# Patient Record
Sex: MALE | Race: WHITE | NOT HISPANIC OR LATINO | Employment: OTHER | URBAN - METROPOLITAN AREA
[De-identification: names, ages, dates, MRNs, and addresses within clinical notes are randomized per-mention and may not be internally consistent; named-entity substitution may affect disease eponyms.]

---

## 2023-09-18 ENCOUNTER — HOSPITAL ENCOUNTER (OUTPATIENT)
Dept: DATA CONVERSION | Facility: HOSPITAL | Age: 73
Discharge: HOME | End: 2023-09-18
Payer: COMMERCIAL

## 2023-09-18 DIAGNOSIS — R97.20 ELEVATED PROSTATE SPECIFIC ANTIGEN (PSA): ICD-10-CM

## 2023-09-18 DIAGNOSIS — E78.5 HYPERLIPIDEMIA, UNSPECIFIED: ICD-10-CM

## 2023-09-18 DIAGNOSIS — E03.9 HYPOTHYROIDISM, UNSPECIFIED: ICD-10-CM

## 2023-09-18 LAB
ALBUMIN SERPL-MCNC: 3.8 GM/DL (ref 3.5–5)
ALBUMIN/GLOB SERPL: 1.3 RATIO (ref 1.5–3)
ALP BLD-CCNC: 68 U/L (ref 35–125)
ALT SERPL-CCNC: 15 U/L (ref 5–40)
ANION GAP SERPL CALCULATED.3IONS-SCNC: 11 MMOL/L (ref 0–19)
APPEARANCE PLAS: CLEAR
AST SERPL-CCNC: 26 U/L (ref 5–40)
BILIRUB SERPL-MCNC: 0.7 MG/DL (ref 0.1–1.2)
BUN SERPL-MCNC: 14 MG/DL (ref 8–25)
BUN/CREAT SERPL: 12.7 RATIO (ref 8–21)
CALCIUM SERPL-MCNC: 9 MG/DL (ref 8.5–10.4)
CHLORIDE SERPL-SCNC: 104 MMOL/L (ref 97–107)
CHOLEST SERPL-MCNC: 172 MG/DL (ref 133–200)
CHOLEST/HDLC SERPL: 2.2 RATIO
CO2 SERPL-SCNC: 23 MMOL/L (ref 24–31)
COLOR SPUN FLD: YELLOW
CREAT SERPL-MCNC: 1.1 MG/DL (ref 0.4–1.6)
FASTING STATUS PATIENT QL REPORTED: NORMAL
GFR SERPL CREATININE-BSD FRML MDRD: 72 ML/MIN/1.73 M2
GLOBULIN SER-MCNC: 2.9 G/DL (ref 1.9–3.7)
GLUCOSE SERPL-MCNC: 89 MG/DL (ref 65–99)
HDLC SERPL-MCNC: 77 MG/DL
LDLC SERPL CALC-MCNC: 86 MG/DL (ref 65–130)
POTASSIUM SERPL-SCNC: 4 MMOL/L (ref 3.4–5.1)
PROT SERPL-MCNC: 6.7 G/DL (ref 5.9–7.9)
PSA SERPL-MCNC: 5.9 NG/ML (ref 0–4.1)
SODIUM SERPL-SCNC: 138 MMOL/L (ref 133–145)
TRIGL SERPL-MCNC: 45 MG/DL (ref 40–150)
TSH SERPL DL<=0.05 MIU/L-ACNC: 3.39 MIU/L (ref 0.27–4.2)

## 2023-09-26 ENCOUNTER — HOSPITAL ENCOUNTER (OUTPATIENT)
Dept: DATA CONVERSION | Facility: HOSPITAL | Age: 73
Discharge: HOME | End: 2023-09-26
Payer: COMMERCIAL

## 2023-09-26 DIAGNOSIS — F17.201 NICOTINE DEPENDENCE, UNSPECIFIED, IN REMISSION: ICD-10-CM

## 2023-10-05 ENCOUNTER — TELEPHONE (OUTPATIENT)
Dept: PRIMARY CARE | Facility: CLINIC | Age: 73
End: 2023-10-05
Payer: COMMERCIAL

## 2023-10-06 ENCOUNTER — TELEPHONE (OUTPATIENT)
Dept: PRIMARY CARE | Facility: CLINIC | Age: 73
End: 2023-10-06
Payer: COMMERCIAL

## 2023-10-06 DIAGNOSIS — E78.5 HYPERLIPIDEMIA, UNSPECIFIED HYPERLIPIDEMIA TYPE: Primary | ICD-10-CM

## 2023-10-11 ENCOUNTER — HOSPITAL ENCOUNTER (OUTPATIENT)
Dept: RADIOLOGY | Facility: HOSPITAL | Age: 73
Discharge: HOME | End: 2023-10-11
Payer: COMMERCIAL

## 2023-10-11 DIAGNOSIS — J44.9 CHRONIC OBSTRUCTIVE PULMONARY DISEASE, UNSPECIFIED (MULTI): ICD-10-CM

## 2023-10-11 PROCEDURE — 75571 CT HRT W/O DYE W/CA TEST: CPT

## 2023-10-13 PROBLEM — E78.5 HYPERLIPIDEMIA: Status: ACTIVE | Noted: 2023-10-13

## 2023-10-13 RX ORDER — ATORVASTATIN CALCIUM 10 MG/1
10 TABLET, FILM COATED ORAL DAILY
Qty: 90 TABLET | Refills: 1 | Status: SHIPPED | OUTPATIENT
Start: 2023-10-13 | End: 2024-01-18

## 2023-10-13 NOTE — TELEPHONE ENCOUNTER
Pt called and given result , states that  he agrees  to the cholesterol lowering medicatiion, so it can be sent to pharmacy.

## 2023-10-13 NOTE — TELEPHONE ENCOUNTER
Atorvastatin 10 mg daily sent to pharmacy with plans to monitor blood work for tolerance/effectiveness

## 2023-10-17 ENCOUNTER — TELEPHONE (OUTPATIENT)
Dept: PRIMARY CARE | Facility: CLINIC | Age: 73
End: 2023-10-17
Payer: COMMERCIAL

## 2023-10-17 DIAGNOSIS — I25.10 CORONARY ATHEROSCLEROSIS DUE TO CALCIFIED CORONARY LESION: ICD-10-CM

## 2023-10-17 DIAGNOSIS — I25.84 CORONARY ATHEROSCLEROSIS DUE TO CALCIFIED CORONARY LESION: ICD-10-CM

## 2023-10-17 DIAGNOSIS — E78.5 HYPERLIPIDEMIA, UNSPECIFIED HYPERLIPIDEMIA TYPE: Primary | ICD-10-CM

## 2023-10-17 NOTE — TELEPHONE ENCOUNTER
Per result documentation, patient had notable calcifications on CT coronary calcium scoring.  Patient requested cardiology consultation.  Referral placed.  Patient would like referral information mailed to him.  Does not want a call back regarding information

## 2023-10-23 ENCOUNTER — TELEPHONE (OUTPATIENT)
Dept: PRIMARY CARE | Facility: CLINIC | Age: 73
End: 2023-10-23
Payer: COMMERCIAL

## 2023-10-23 DIAGNOSIS — I25.10 ATHEROSCLEROSIS OF NATIVE CORONARY ARTERY OF NATIVE HEART WITHOUT ANGINA PECTORIS: Primary | ICD-10-CM

## 2023-10-23 DIAGNOSIS — E78.5 HYPERLIPIDEMIA, UNSPECIFIED HYPERLIPIDEMIA TYPE: ICD-10-CM

## 2023-10-23 NOTE — TELEPHONE ENCOUNTER
Pt states the he set up a appointment 12/04/2023 at 1:30pm with DR. Pio Nino, states that he needs a referral sent over. Please advise

## 2023-10-24 DIAGNOSIS — E03.8 SUBCLINICAL HYPOTHYROIDISM: ICD-10-CM

## 2023-10-27 PROBLEM — E75.6 LIPIDOSIS: Status: ACTIVE | Noted: 2023-10-27

## 2023-10-27 PROBLEM — E78.5 COMPLEX DYSLIPIDEMIA: Status: ACTIVE | Noted: 2023-10-27

## 2023-10-27 PROBLEM — E03.8 SUBCLINICAL HYPOTHYROIDISM: Status: ACTIVE | Noted: 2023-10-27

## 2023-10-27 PROBLEM — K86.89 SECONDARY PANCREATIC INSUFFICIENCY (HHS-HCC): Status: ACTIVE | Noted: 2023-10-27

## 2023-10-27 PROBLEM — E05.90 SUBCLINICAL HYPERTHYROIDISM: Status: ACTIVE | Noted: 2023-10-27

## 2023-10-27 PROBLEM — I38 VALVULAR HEART DISEASE: Status: ACTIVE | Noted: 2023-10-27

## 2023-10-27 PROBLEM — G47.19 EXCESSIVE DAYTIME SLEEPINESS: Status: ACTIVE | Noted: 2023-10-27

## 2023-10-27 PROBLEM — E05.80 IATROGENIC HYPERTHYROIDISM: Status: ACTIVE | Noted: 2023-10-27

## 2023-10-27 PROBLEM — M19.90 ARTHRITIS: Status: ACTIVE | Noted: 2023-10-27

## 2023-10-27 PROBLEM — G43.909 MIGRAINE: Status: ACTIVE | Noted: 2023-10-27

## 2023-10-27 PROBLEM — G47.33 OBSTRUCTIVE SLEEP APNEA SYNDROME: Status: ACTIVE | Noted: 2023-10-27

## 2023-10-27 PROBLEM — R63.6 MILDLY UNDERWEIGHT ADULT: Status: ACTIVE | Noted: 2023-10-27

## 2023-10-27 PROBLEM — G47.30 SLEEP-DISORDERED BREATHING: Status: ACTIVE | Noted: 2023-10-27

## 2023-10-27 PROBLEM — K21.9 GASTROESOPHAGEAL REFLUX DISEASE: Status: ACTIVE | Noted: 2023-10-27

## 2023-10-27 PROBLEM — C15.9 ESOPHAGEAL CANCER (MULTI): Status: ACTIVE | Noted: 2023-10-27

## 2023-10-27 PROBLEM — J44.9 CHRONIC OBSTRUCTIVE PULMONARY DISEASE (MULTI): Status: ACTIVE | Noted: 2023-10-27

## 2023-10-27 PROBLEM — E06.3 AUTOIMMUNE THYROIDITIS: Status: ACTIVE | Noted: 2023-10-27

## 2023-10-27 PROBLEM — G43.009 MIGRAINE WITHOUT AURA AND WITHOUT STATUS MIGRAINOSUS, NOT INTRACTABLE: Status: ACTIVE | Noted: 2023-10-27

## 2023-10-27 PROBLEM — R53.83 FATIGUE: Status: ACTIVE | Noted: 2023-10-27

## 2023-10-27 PROBLEM — G44.009 CLUSTER HEADACHE SYNDROME: Status: ACTIVE | Noted: 2023-10-27

## 2023-10-27 PROBLEM — M54.12 CERVICAL RADICULOPATHY: Status: ACTIVE | Noted: 2023-10-27

## 2023-10-27 RX ORDER — RIZATRIPTAN BENZOATE 5 MG/1
1 TABLET ORAL DAILY PRN
COMMUNITY
Start: 2021-10-21 | End: 2024-01-18

## 2023-10-27 RX ORDER — MOMETASONE FUROATE AND FORMOTEROL FUMARATE DIHYDRATE 200; 5 UG/1; UG/1
AEROSOL RESPIRATORY (INHALATION) EVERY 12 HOURS
COMMUNITY
Start: 2021-12-08

## 2023-10-27 RX ORDER — PANTOPRAZOLE SODIUM 40 MG/1
TABLET, DELAYED RELEASE ORAL
COMMUNITY
Start: 2014-10-26 | End: 2024-01-22 | Stop reason: SDUPTHER

## 2023-10-27 RX ORDER — LEVOTHYROXINE SODIUM 50 UG/1
TABLET ORAL
Qty: 102 TABLET | Refills: 1 | Status: SHIPPED | OUTPATIENT
Start: 2023-10-27 | End: 2024-04-17

## 2023-10-27 RX ORDER — ALBUTEROL SULFATE 90 UG/1
AEROSOL, METERED RESPIRATORY (INHALATION) EVERY 4 HOURS
COMMUNITY

## 2023-10-27 RX ORDER — LEVOTHYROXINE SODIUM 50 UG/1
50 TABLET ORAL
COMMUNITY
End: 2024-01-18 | Stop reason: SDUPTHER

## 2023-11-27 ENCOUNTER — APPOINTMENT (OUTPATIENT)
Dept: UROLOGY | Facility: CLINIC | Age: 73
End: 2023-11-27
Payer: COMMERCIAL

## 2023-11-30 ENCOUNTER — OFFICE VISIT (OUTPATIENT)
Dept: UROLOGY | Facility: CLINIC | Age: 73
End: 2023-11-30
Payer: COMMERCIAL

## 2023-11-30 ENCOUNTER — LAB (OUTPATIENT)
Dept: LAB | Facility: LAB | Age: 73
End: 2023-11-30
Payer: COMMERCIAL

## 2023-11-30 DIAGNOSIS — R97.20 ELEVATED PSA: ICD-10-CM

## 2023-11-30 DIAGNOSIS — N40.0 PROSTATE ENLARGEMENT: Primary | ICD-10-CM

## 2023-11-30 LAB
APPEARANCE UR: CLEAR
BILIRUB UR STRIP.AUTO-MCNC: NEGATIVE MG/DL
COLOR UR: NORMAL
CREAT SERPL-MCNC: 1.11 MG/DL (ref 0.5–1.3)
GFR SERPL CREATININE-BSD FRML MDRD: 71 ML/MIN/1.73M*2
GLUCOSE UR STRIP.AUTO-MCNC: NEGATIVE MG/DL
KETONES UR STRIP.AUTO-MCNC: NEGATIVE MG/DL
LEUKOCYTE ESTERASE UR QL STRIP.AUTO: NEGATIVE
NITRITE UR QL STRIP.AUTO: NEGATIVE
PH UR STRIP.AUTO: 5 [PH]
PROT UR STRIP.AUTO-MCNC: NEGATIVE MG/DL
RBC # UR STRIP.AUTO: NEGATIVE /UL
SP GR UR STRIP.AUTO: 1.01
UROBILINOGEN UR STRIP.AUTO-MCNC: <2 MG/DL

## 2023-11-30 PROCEDURE — 36415 COLL VENOUS BLD VENIPUNCTURE: CPT

## 2023-11-30 PROCEDURE — 81003 URINALYSIS AUTO W/O SCOPE: CPT

## 2023-11-30 PROCEDURE — 99204 OFFICE O/P NEW MOD 45 MIN: CPT | Performed by: STUDENT IN AN ORGANIZED HEALTH CARE EDUCATION/TRAINING PROGRAM

## 2023-11-30 PROCEDURE — 82565 ASSAY OF CREATININE: CPT

## 2023-11-30 PROCEDURE — 87086 URINE CULTURE/COLONY COUNT: CPT

## 2023-11-30 NOTE — PROGRESS NOTES
Subjective   Patient ID:   HPI    Irving Sandoval is a 73 y.o. male who presents for elevated psa referred by Dr Todd.    No LUTS.    No prior investigation of elevated PSA as per the patient    Cancer esophageal in 2015  No issue speeing  Fam hx father pelvic CA, Mother neck CA  Brother passed away from CA, unsure what type  Not sexually active.        Last PSA 09/18/2023:  Lab Results   Component Value Date    PSA 5.9 (H) 09/18/2023    PSA 5.2 (H) 03/02/2023    PSA 4.7 (H) 10/14/2022         Review of Systems    A complete review of systems was performed. All systems are noted to be negative unless indicated in the history of present illness, impression, active problem list, or past histories.      Objective   Physical Exam    General: Well developed, well nourished, alert and cooperative, appears in no acute distress     Eyes: Non-injected conjunctiva, sclera clear, no proptosis     Cardiac: Extremities are warm and well perfused. No edema, cyanosis or pallor.     Lungs: Breathing is easy, non-labored. Speaking in clear and complete sentences. Normal diaphragmatic movement.     Abdomen: soft, non-tender     MSK: Ambulatory with steady gait, unassisted     Neuro: alert and oriented to person, place and time     Psych: Demonstrates good judgement and reason, without hallucinations, abnormal affect or abnormal behaviors.     Skin: no obvious lesions, no rashes.     : phallus circumcised, normal in size, no plaques or lesions. Testicles normal in size and texture, no masses     JAIME: prostate enlarged, smooth surface, no nodules       Assessment/Plan   Diagnoses and all orders for this visit:  Elevated PSA  -     Urine culture; Future  -     Urinalysis with Reflex Microscopic; Future  -     MR prostate geraldine boundaries; Future  -     Creatinine, Serum; Future       Elevated PSA:    I reviewed with the patient the value and significance of a rising PSA, and the role in screening and early detection of prostate  cancer. I explained that PSA is prostate specific, yet not prostate cancer specific, and typical etiology for the rise of PSA include UTI, prostate enlargement, prostatic inflammation such as a bacterial prostatitis, urinary retention, and prostate cancer. To narrow our differential diagnosis, we rule out urinary infection and retention, and perform a prostate MRI which will give us more information about the size of the prostate, possible inflammation, and suspicious lesions which will serve as targets for MRI/Ultrasound guided fusion targeted biopsy of the prostate.  Positive MRI will necessitate a biopsy of the indicated lesions, whereas a negative MRI will be interpreted in the context of the clinical suspicion, which includes the PSA velocity (speed of rise of PSA), PSA density, age of the patient, and positive family history for prostate cancer or even breast cancer.  I explained to him that prostate cancer is the most common malignancy in men, however it is not the most common cancer killer for several reasons. These are related to the fact that prostate cancer is mostly not aggressive, slow in it growth, progression, and recurrence. In a lot of men, prostate cancer is not diagnosed and still does not result in cancer-related death, and as such active surveillance has become an accepted management option in low-grade low-stage low-volume prostate cancer. A proportion of men with prostate cancer still require treatment in order to prevent progression and metastasis, and some men might require a multidisciplinary management including different combinations of surgery, radiation, and systemic therapy. In order to better diagnose and decide on treatment options in prostate cancer, proper diagnosis should be performed and shared decision making between the physician and the patient is key at that point.    The patient understands the overall situation, and is willing to proceed with the plan starting with urine  testing followed by MRI of the prostate.    Plan:  Urinalysis and urine culture  MRI of the prostate  Fu in 6 weeks        Scribe Attestation  By signing my name below, I, Bea Marinelli   attest that this documentation has been prepared under the direction and in the presence of Tj Coley MD.

## 2023-12-01 LAB — BACTERIA UR CULT: NO GROWTH

## 2023-12-04 ENCOUNTER — OFFICE VISIT (OUTPATIENT)
Dept: CARDIOLOGY | Facility: CLINIC | Age: 73
End: 2023-12-04
Payer: COMMERCIAL

## 2023-12-04 VITALS
DIASTOLIC BLOOD PRESSURE: 82 MMHG | OXYGEN SATURATION: 96 % | WEIGHT: 96 LBS | BODY MASS INDEX: 15.49 KG/M2 | SYSTOLIC BLOOD PRESSURE: 139 MMHG | HEART RATE: 72 BPM

## 2023-12-04 DIAGNOSIS — E78.5 HYPERLIPIDEMIA, UNSPECIFIED HYPERLIPIDEMIA TYPE: ICD-10-CM

## 2023-12-04 DIAGNOSIS — I25.84 CORONARY ATHEROSCLEROSIS DUE TO CALCIFIED CORONARY LESION: ICD-10-CM

## 2023-12-04 DIAGNOSIS — I25.10 ATHEROSCLEROSIS OF NATIVE CORONARY ARTERY OF NATIVE HEART WITHOUT ANGINA PECTORIS: ICD-10-CM

## 2023-12-04 DIAGNOSIS — I25.10 CORONARY ATHEROSCLEROSIS DUE TO CALCIFIED CORONARY LESION: ICD-10-CM

## 2023-12-04 PROCEDURE — 99214 OFFICE O/P EST MOD 30 MIN: CPT | Performed by: INTERNAL MEDICINE

## 2023-12-04 PROCEDURE — 93005 ELECTROCARDIOGRAM TRACING: CPT | Performed by: INTERNAL MEDICINE

## 2023-12-04 PROCEDURE — 1126F AMNT PAIN NOTED NONE PRSNT: CPT | Performed by: INTERNAL MEDICINE

## 2023-12-04 PROCEDURE — 1160F RVW MEDS BY RX/DR IN RCRD: CPT | Performed by: INTERNAL MEDICINE

## 2023-12-04 PROCEDURE — 1036F TOBACCO NON-USER: CPT | Performed by: INTERNAL MEDICINE

## 2023-12-04 PROCEDURE — 1159F MED LIST DOCD IN RCRD: CPT | Performed by: INTERNAL MEDICINE

## 2023-12-04 PROCEDURE — 99204 OFFICE O/P NEW MOD 45 MIN: CPT | Performed by: INTERNAL MEDICINE

## 2023-12-04 PROCEDURE — 93010 ELECTROCARDIOGRAM REPORT: CPT | Performed by: INTERNAL MEDICINE

## 2023-12-04 RX ORDER — TIOTROPIUM BROMIDE 18 UG/1
1 CAPSULE ORAL; RESPIRATORY (INHALATION)
COMMUNITY
End: 2024-01-18

## 2023-12-04 ASSESSMENT — ENCOUNTER SYMPTOMS
COUGH: 1
EYES NEGATIVE: 1
DEPRESSION: 0
CONSTITUTIONAL NEGATIVE: 1
LOSS OF SENSATION IN FEET: 0
SHORTNESS OF BREATH: 1
OCCASIONAL FEELINGS OF UNSTEADINESS: 0
VOMITING: 0
NEUROLOGICAL NEGATIVE: 1
NAUSEA: 0

## 2023-12-04 ASSESSMENT — PATIENT HEALTH QUESTIONNAIRE - PHQ9
SUM OF ALL RESPONSES TO PHQ9 QUESTIONS 1 AND 2: 0
1. LITTLE INTEREST OR PLEASURE IN DOING THINGS: NOT AT ALL
2. FEELING DOWN, DEPRESSED OR HOPELESS: NOT AT ALL

## 2023-12-04 ASSESSMENT — PAIN SCALES - GENERAL: PAINLEVEL: 0-NO PAIN

## 2023-12-04 NOTE — PROGRESS NOTES
Subjective      Chief Complaint   Patient presents with    Dr. Todd ref pt for recent ct calcium score          This is a 73-year-old white male we are asked to see for cardiac evaluation.  He does have a history of smoking which he quit in 2014 he also has a history of hypercholesterolemia.  There is no history of hypertension diabetes mellitus or family history coronary artery disease.  He had a cardiac calcium score it was 424.  He does have sleep apnea and does get short of breath with minimal exertion.  He says he can walk maybe 50 feet before he gets short of breath.  Approximately 2 nights ago he complained of some chest discomfort located midsternally he cannot say for sharp he was short of breath with it.  It lasted approximately 2 hours.  He has not had before nor since.  He does not complain symptoms of PND or orthopnea.  He was told a child he does have a heart murmur but that is gone.  He is never had rheumatic fever.  He does have a history of esophageal cancer in 2015 and had surgery for this with part of his stomach being moved up into the chest.  His EKG done today shows a normal sinus rhythm. He has cholesterol drawn with a total of 872 HDL 77 and the LDL was 86.  He has been started on statin has not started taking it as of yet.           Review of Systems   Constitutional: Negative.   HENT: Negative.     Eyes: Negative.    Respiratory:  Positive for cough and shortness of breath.    Skin: Negative.    Musculoskeletal:  Positive for arthritis.   Gastrointestinal:  Negative for nausea and vomiting.   Genitourinary: Negative.    Neurological: Negative.         History reviewed. No pertinent surgical history.     Active Ambulatory Problems     Diagnosis Date Noted    Hyperlipidemia 10/13/2023    Atherosclerosis of native coronary artery of native heart without angina pectoris 10/23/2023    Arthritis 10/27/2023    Cervical radiculopathy 10/27/2023    Chronic obstructive pulmonary disease (CMS/[facility])  10/27/2023    Cluster headache syndrome 10/27/2023    Complex dyslipidemia 10/27/2023    Esophageal cancer (CMS/HCC) 10/27/2023    Excessive daytime sleepiness 10/27/2023    Fatigue 10/27/2023    Gastroesophageal reflux disease 10/27/2023    Autoimmune thyroiditis 10/27/2023    Iatrogenic hyperthyroidism 10/27/2023    Lipidosis 10/27/2023    Migraine 10/27/2023    Migraine without aura and without status migrainosus, not intractable 10/27/2023    Mildly underweight adult 10/27/2023    Obstructive sleep apnea syndrome 10/27/2023    Sleep-disordered breathing 10/27/2023    Secondary pancreatic insufficiency 10/27/2023    Subclinical hyperthyroidism 10/27/2023    Subclinical hypothyroidism 10/27/2023    Valvular heart disease 10/27/2023     Resolved Ambulatory Problems     Diagnosis Date Noted    No Resolved Ambulatory Problems     Past Medical History:   Diagnosis Date    COPD (chronic obstructive pulmonary disease) (CMS/HCC)     Hyperlipidemia, unspecified     Hypothyroidism, unspecified     Lipid storage disorder, unspecified     Migraine, unspecified, not intractable, without status migrainosus     Obstructive sleep apnea (adult) (pediatric)     Other hypersomnia     Personal history of malignant neoplasm of esophagus     Personal history of other diseases of the circulatory system     Personal history of other diseases of the musculoskeletal system and connective tissue     Personal history of other endocrine, nutritional and metabolic disease     Personal history of other endocrine, nutritional and metabolic disease     Personal history of other specified conditions     Radiculopathy, cervical region     Sleep apnea, unspecified         Visit Vitals  /82   Pulse 72   Wt (!) 43.5 kg (96 lb)   SpO2 96%   BMI 15.49 kg/m²   Smoking Status Former   BSA 1.42 m²        Objective     Constitutional:       Appearance: Healthy appearance.   Eyes:      Pupils: Pupils are equal, round, and reactive to light.   Neck:     "  Vascular: JVD normal.   Pulmonary:      Breath sounds: Normal breath sounds.   Cardiovascular:      PMI at left midclavicular line. Normal rate. Regular rhythm. Normal S1. Normal S2.       Murmurs: There is no murmur.      No gallop.  No click. No rub.   Pulses:     Intact distal pulses.   Edema:     Peripheral edema absent.   Abdominal:      Palpations: Abdomen is soft.      Tenderness: There is no abdominal tenderness.   Musculoskeletal:      Extremities: No clubbing present.Skin:     General: Skin is warm and dry.   Neurological:      General: No focal deficit present.            Lab Review:         Lab Results   Component Value Date    CHOL 172 09/18/2023    CHOL 159 10/14/2022    CHOL 191 09/28/2021     Lab Results   Component Value Date    HDL 77 09/18/2023    HDL 57 10/14/2022    HDL 75 09/28/2021     Lab Results   Component Value Date    LDLCALC 86 09/18/2023    LDLCALC 85 10/14/2022    LDLCALC 102 09/28/2021     Lab Results   Component Value Date    TRIG 45 09/18/2023    TRIG 87 10/14/2022    TRIG 70 09/28/2021     No components found for: \"CHOLHDL\"     Assessment/Plan     Atherosclerosis of native coronary artery of native heart without angina pectoris  This is a 73-year-old white male who does have relatively high cardiac calcium score at 424.  He was a smoker does have COPD as well as sleep apnea.  He does have problems with walking with shortness of breath and fatigue.  He did have some episode approximately 2 or 3 nights ago with prolonged chest discomforts.  Cannot say if this was an anginal episode or neuromuscular.  Would like to do a stress test on him.  He is unable to walk because of his COPD and we will do a Lexiscan stress test.  Would like to see him back for results.    Hyperlipidemia  He does have a elevated cardiac calcium score he was to be started on atorvastatin which she has not taken as of yet.  I encouraged him to start this.  Like to see him back after his stress test.     "

## 2023-12-04 NOTE — ASSESSMENT & PLAN NOTE
He does have a elevated cardiac calcium score he was to be started on atorvastatin which she has not taken as of yet.  I encouraged him to start this.  Like to see him back after his stress test.

## 2023-12-04 NOTE — ASSESSMENT & PLAN NOTE
This is a 73-year-old white male who does have relatively high cardiac calcium score at 424.  He was a smoker does have COPD as well as sleep apnea.  He does have problems with walking with shortness of breath and fatigue.  He did have some episode approximately 2 or 3 nights ago with prolonged chest discomforts.  Cannot say if this was an anginal episode or neuromuscular.  Would like to do a stress test on him.  He is unable to walk because of his COPD and we will do a Lexiscan stress test.  Would like to see him back for results.

## 2023-12-07 DIAGNOSIS — R97.20 ELEVATED PSA: ICD-10-CM

## 2023-12-19 ENCOUNTER — HOSPITAL ENCOUNTER (OUTPATIENT)
Dept: RADIOLOGY | Facility: HOSPITAL | Age: 73
End: 2023-12-19
Payer: COMMERCIAL

## 2023-12-19 ENCOUNTER — APPOINTMENT (OUTPATIENT)
Dept: CARDIOLOGY | Facility: HOSPITAL | Age: 73
End: 2023-12-19
Payer: COMMERCIAL

## 2023-12-19 ENCOUNTER — HOSPITAL ENCOUNTER (OUTPATIENT)
Dept: RADIOLOGY | Facility: HOSPITAL | Age: 73
Discharge: HOME | End: 2023-12-19
Payer: COMMERCIAL

## 2023-12-19 DIAGNOSIS — I25.84 CORONARY ATHEROSCLEROSIS DUE TO CALCIFIED CORONARY LESION: ICD-10-CM

## 2023-12-19 DIAGNOSIS — I25.10 CORONARY ATHEROSCLEROSIS DUE TO CALCIFIED CORONARY LESION: ICD-10-CM

## 2023-12-20 ENCOUNTER — TELEPHONE (OUTPATIENT)
Dept: PRIMARY CARE | Facility: CLINIC | Age: 73
End: 2023-12-20

## 2023-12-20 NOTE — TELEPHONE ENCOUNTER
Pt called our office stating that he has a MRI scheduled for tomorrow, states that his insurance denied it due to not having enough information regarding the reason behind the order,states that they need more information to be sent over in order for them to approve or pt may need to cancel his MRI. Please advise

## 2023-12-21 ENCOUNTER — APPOINTMENT (OUTPATIENT)
Dept: RADIOLOGY | Facility: HOSPITAL | Age: 73
End: 2023-12-21
Payer: COMMERCIAL

## 2023-12-22 ENCOUNTER — HOSPITAL ENCOUNTER (OUTPATIENT)
Dept: CARDIOLOGY | Facility: HOSPITAL | Age: 73
Discharge: HOME | End: 2023-12-22
Payer: COMMERCIAL

## 2023-12-22 ENCOUNTER — HOSPITAL ENCOUNTER (OUTPATIENT)
Dept: RADIOLOGY | Facility: HOSPITAL | Age: 73
Discharge: HOME | End: 2023-12-22
Payer: COMMERCIAL

## 2023-12-22 PROCEDURE — 3430000001 HC RX 343 DIAGNOSTIC RADIOPHARMACEUTICALS: Performed by: INTERNAL MEDICINE

## 2023-12-22 PROCEDURE — 2500000004 HC RX 250 GENERAL PHARMACY W/ HCPCS (ALT 636 FOR OP/ED): Performed by: INTERNAL MEDICINE

## 2023-12-22 PROCEDURE — A9502 TC99M TETROFOSMIN: HCPCS | Performed by: INTERNAL MEDICINE

## 2023-12-22 PROCEDURE — 93017 CV STRESS TEST TRACING ONLY: CPT

## 2023-12-22 PROCEDURE — 78452 HT MUSCLE IMAGE SPECT MULT: CPT

## 2023-12-22 PROCEDURE — 93017 CV STRESS TEST TRACING ONLY: CPT | Mod: MUE

## 2023-12-22 RX ORDER — REGADENOSON 0.08 MG/ML
0.4 INJECTION, SOLUTION INTRAVENOUS ONCE
Status: COMPLETED | OUTPATIENT
Start: 2023-12-22 | End: 2023-12-22

## 2023-12-22 RX ADMIN — REGADENOSON 0.4 MG: 0.08 INJECTION, SOLUTION INTRAVENOUS at 13:00

## 2023-12-22 RX ADMIN — TETROFOSMIN 11.2 MILLICURIE: 0.23 INJECTION, POWDER, LYOPHILIZED, FOR SOLUTION INTRAVENOUS at 12:00

## 2023-12-22 RX ADMIN — TETROFOSMIN 30.7 MILLICURIE: 0.23 INJECTION, POWDER, LYOPHILIZED, FOR SOLUTION INTRAVENOUS at 13:00

## 2024-01-02 PROCEDURE — 93016 CV STRESS TEST SUPVJ ONLY: CPT | Performed by: INTERNAL MEDICINE

## 2024-01-11 ENCOUNTER — TELEPHONE (OUTPATIENT)
Dept: PRIMARY CARE | Facility: CLINIC | Age: 74
End: 2024-01-11
Payer: COMMERCIAL

## 2024-01-11 ENCOUNTER — TELEPHONE (OUTPATIENT)
Dept: CARDIOLOGY | Facility: CLINIC | Age: 74
End: 2024-01-11
Payer: COMMERCIAL

## 2024-01-11 DIAGNOSIS — E78.5 HYPERLIPIDEMIA, UNSPECIFIED HYPERLIPIDEMIA TYPE: Primary | ICD-10-CM

## 2024-01-11 DIAGNOSIS — I25.10 ATHEROSCLEROSIS OF NATIVE CORONARY ARTERY OF NATIVE HEART WITHOUT ANGINA PECTORIS: ICD-10-CM

## 2024-01-11 RX ORDER — ROSUVASTATIN CALCIUM 5 MG/1
5 TABLET, COATED ORAL DAILY
Qty: 90 TABLET | Refills: 1 | Status: SHIPPED | OUTPATIENT
Start: 2024-01-11 | End: 2024-01-18

## 2024-01-11 NOTE — TELEPHONE ENCOUNTER
While abdominal pain is not typically a side effect from cholesterol medication, can try alternative version in the form of low-dose Crestor to see if this is better tolerated.  Prescription sent to pharmacy

## 2024-01-11 NOTE — TELEPHONE ENCOUNTER
Patient called the office today asking if he can get his ST results?  Please advise, thanks  Call back [phone]    Called patient and scheduled him an appointment for results

## 2024-01-11 NOTE — TELEPHONE ENCOUNTER
[phone].  Pt states he was recently started on atorvastatin by pcp. He states he takes it at 1 pm every day and since starting it he will wake up with abdominal pain the next day. He wants to know if this could be related to the medication or if there is something else he could try taking. Please advise.

## 2024-01-12 ENCOUNTER — HOSPITAL ENCOUNTER (OUTPATIENT)
Dept: RADIOLOGY | Facility: HOSPITAL | Age: 74
Discharge: HOME | End: 2024-01-12
Payer: COMMERCIAL

## 2024-01-12 DIAGNOSIS — R97.20 ELEVATED PSA: ICD-10-CM

## 2024-01-12 PROCEDURE — A9575 INJ GADOTERATE MEGLUMI 0.1ML: HCPCS | Performed by: STUDENT IN AN ORGANIZED HEALTH CARE EDUCATION/TRAINING PROGRAM

## 2024-01-12 PROCEDURE — 72197 MRI PELVIS W/O & W/DYE: CPT | Performed by: RADIOLOGY

## 2024-01-12 PROCEDURE — 72197 MRI PELVIS W/O & W/DYE: CPT

## 2024-01-12 PROCEDURE — 2550000001 HC RX 255 CONTRASTS: Performed by: STUDENT IN AN ORGANIZED HEALTH CARE EDUCATION/TRAINING PROGRAM

## 2024-01-12 RX ORDER — GADOTERATE MEGLUMINE 376.9 MG/ML
8 INJECTION INTRAVENOUS
Status: COMPLETED | OUTPATIENT
Start: 2024-01-12 | End: 2024-01-12

## 2024-01-12 RX ADMIN — GADOTERATE MEGLUMINE 8 ML: 376.9 INJECTION INTRAVENOUS at 17:47

## 2024-01-18 ENCOUNTER — OFFICE VISIT (OUTPATIENT)
Dept: CARDIOLOGY | Facility: CLINIC | Age: 74
End: 2024-01-18
Payer: COMMERCIAL

## 2024-01-18 VITALS
SYSTOLIC BLOOD PRESSURE: 126 MMHG | HEART RATE: 78 BPM | WEIGHT: 92 LBS | BODY MASS INDEX: 15.31 KG/M2 | DIASTOLIC BLOOD PRESSURE: 86 MMHG | OXYGEN SATURATION: 95 %

## 2024-01-18 DIAGNOSIS — I25.10 ATHEROSCLEROSIS OF NATIVE CORONARY ARTERY OF NATIVE HEART WITHOUT ANGINA PECTORIS: ICD-10-CM

## 2024-01-18 DIAGNOSIS — I25.10 CORONARY ATHEROSCLEROSIS DUE TO CALCIFIED CORONARY LESION: Primary | ICD-10-CM

## 2024-01-18 DIAGNOSIS — I25.84 CORONARY ATHEROSCLEROSIS DUE TO CALCIFIED CORONARY LESION: Primary | ICD-10-CM

## 2024-01-18 PROCEDURE — 1159F MED LIST DOCD IN RCRD: CPT | Performed by: INTERNAL MEDICINE

## 2024-01-18 PROCEDURE — 1036F TOBACCO NON-USER: CPT | Performed by: INTERNAL MEDICINE

## 2024-01-18 PROCEDURE — 99213 OFFICE O/P EST LOW 20 MIN: CPT | Performed by: INTERNAL MEDICINE

## 2024-01-18 PROCEDURE — 1126F AMNT PAIN NOTED NONE PRSNT: CPT | Performed by: INTERNAL MEDICINE

## 2024-01-18 PROCEDURE — 1160F RVW MEDS BY RX/DR IN RCRD: CPT | Performed by: INTERNAL MEDICINE

## 2024-01-18 RX ORDER — ISOSORBIDE MONONITRATE 30 MG/1
30 TABLET, EXTENDED RELEASE ORAL DAILY
Qty: 30 TABLET | Refills: 11 | Status: SHIPPED | OUTPATIENT
Start: 2024-01-18 | End: 2025-01-17

## 2024-01-18 RX ORDER — ROSUVASTATIN CALCIUM 5 MG/1
5 TABLET, COATED ORAL DAILY
Qty: 90 TABLET | Refills: 1 | Status: SHIPPED | OUTPATIENT
Start: 2024-01-18 | End: 2024-07-16

## 2024-01-18 ASSESSMENT — PAIN SCALES - GENERAL: PAINLEVEL: 0-NO PAIN

## 2024-01-18 NOTE — ASSESSMENT & PLAN NOTE
The stress shows he ay have had an MI and he may have had symptoms of it months ago.  Will treat medically for now.  Will start on imdur and hold off on beta blockers due to his lungs.  Will see in 4 month

## 2024-01-18 NOTE — TELEPHONE ENCOUNTER
In chart review, it appears that patient saw Dr. Nino of cardiology earlier today.  When Dr. Nino's nurse did his medication requisite and he stated that he had not started the Crestor, medication was discontinued with message sent to pharmacy to cancel prescription that was waiting for him.  I have resent medication with plans for us to trial

## 2024-01-18 NOTE — TELEPHONE ENCOUNTER
Patient states that his crestor rx was denied by pharmacy. I do not see an rx sent. Please advise on this for pt

## 2024-01-22 ENCOUNTER — OFFICE VISIT (OUTPATIENT)
Dept: UROLOGY | Facility: CLINIC | Age: 74
End: 2024-01-22
Payer: COMMERCIAL

## 2024-01-22 ENCOUNTER — TELEPHONE (OUTPATIENT)
Dept: HEMATOLOGY/ONCOLOGY | Facility: CLINIC | Age: 74
End: 2024-01-22

## 2024-01-22 DIAGNOSIS — K21.00 GASTROESOPHAGEAL REFLUX DISEASE WITH ESOPHAGITIS WITHOUT HEMORRHAGE: Primary | ICD-10-CM

## 2024-01-22 DIAGNOSIS — R97.20 HIGH PROSTATE SPECIFIC ANTIGEN (PSA): Primary | ICD-10-CM

## 2024-01-22 PROCEDURE — 1126F AMNT PAIN NOTED NONE PRSNT: CPT | Performed by: STUDENT IN AN ORGANIZED HEALTH CARE EDUCATION/TRAINING PROGRAM

## 2024-01-22 PROCEDURE — 1159F MED LIST DOCD IN RCRD: CPT | Performed by: STUDENT IN AN ORGANIZED HEALTH CARE EDUCATION/TRAINING PROGRAM

## 2024-01-22 PROCEDURE — 99213 OFFICE O/P EST LOW 20 MIN: CPT | Performed by: STUDENT IN AN ORGANIZED HEALTH CARE EDUCATION/TRAINING PROGRAM

## 2024-01-22 PROCEDURE — 1160F RVW MEDS BY RX/DR IN RCRD: CPT | Performed by: STUDENT IN AN ORGANIZED HEALTH CARE EDUCATION/TRAINING PROGRAM

## 2024-01-22 PROCEDURE — 1036F TOBACCO NON-USER: CPT | Performed by: STUDENT IN AN ORGANIZED HEALTH CARE EDUCATION/TRAINING PROGRAM

## 2024-01-22 RX ORDER — PANTOPRAZOLE SODIUM 40 MG/1
TABLET, DELAYED RELEASE ORAL
Qty: 30 TABLET | Refills: 3 | Status: SHIPPED | OUTPATIENT
Start: 2024-01-22 | End: 2024-05-22 | Stop reason: SDUPTHER

## 2024-01-22 NOTE — PROGRESS NOTES
Subjective   Patient ID: Irving Sandoval is a 73 y.o. male.    HPI  Irving Sandoval is a 73 y.o. male who presents for elevated psa referred by Dr Todd. On oxygen, hx cardiac issues. Presents for MRI review. Latest PSA 5.9.     No LUTS. He is happy with urination.      No prior investigation of elevated PSA as per the patient     Cancer esophageal in 2015  No issue speeing  Fam hx father pelvic CA, Mother neck CA  Brother passed away from CA, unsure what type  Not sexually active.    1/12/24 MRI prostate.   IMPRESSION:  1.  BPH changes of the transition zone. Diffuse non nodular  hypointensities within the peripheral zone, without evidence of  focally restricted diffusion ( PI-RADS 2).      PI-RADS 2 - Low (clinically significant cancer is unlikely to be  present).    Lab Results   Component Value Date    PSA 5.9 (H) 09/18/2023    PSA 5.2 (H) 03/02/2023    PSA 4.7 (H) 10/14/2022    PSA 3.7 09/28/2021    PSA 4.4 (H) 11/21/2019         Review of Systems   All other systems reviewed and are negative.      Objective   Physical Exam  Vitals reviewed.         Assessment/Plan   Irving Sandoval is a 73 y.o. male who presents for elevated psa referred by Dr Todd. On oxygen, hx cardiac issues. Presents for MRI review. Latest PSA 5.9. MRI negative, PSA density 0.18. Discussed deferring biopsy because of his high medical risk.    No LUTS. He is happy with urination.     I personally reviewed his MRI which showed BPH changes. No evidence of malignancy, PI-RADS 2.     At this point, we discussed continuing to monitor PSA and deferring biopsy for now. He agrees with plan.     Plan:   PSA March 2024.   FUV April 2024.   There are no diagnoses linked to this encounter.  Scribe Attestation  By signing my name below, I, Bea Garcias attest that this documentation has been prepared under the direction and in the presence of Tj Coley MD.

## 2024-01-24 ENCOUNTER — TELEPHONE (OUTPATIENT)
Dept: CARDIOLOGY | Facility: CLINIC | Age: 74
End: 2024-01-24
Payer: COMMERCIAL

## 2024-01-24 NOTE — TELEPHONE ENCOUNTER
Patient called the office today stating that he is suppose to take isosorbide 30mg daily and 2 pills on Sunday.  I checked the last office note and it did not say anywhere to take 2 pills on Sunday.  Just clarifying his correct dosage, if it is 2 on Sunday, he needs new rx sent to pharmacy.  Please advise, thanks    Called patient with response, he expressed undersanding.

## 2024-02-01 ENCOUNTER — TELEPHONE (OUTPATIENT)
Dept: PRIMARY CARE | Facility: CLINIC | Age: 74
End: 2024-02-01
Payer: COMMERCIAL

## 2024-02-01 NOTE — TELEPHONE ENCOUNTER
Sister Becky Fernandes calling to talk about patients health would like a phone call back [phone] states she is his emergency

## 2024-02-08 NOTE — TELEPHONE ENCOUNTER
Spoke with patients sister. Sister states she was concerned regarding his recent prostate MRI. I advised sister to call MD office that ordered this test for the results.

## 2024-02-13 ENCOUNTER — LAB (OUTPATIENT)
Dept: LAB | Facility: LAB | Age: 74
End: 2024-02-13
Payer: COMMERCIAL

## 2024-02-13 DIAGNOSIS — R97.20 HIGH PROSTATE SPECIFIC ANTIGEN (PSA): ICD-10-CM

## 2024-02-13 LAB — PSA SERPL-MCNC: 4.8 NG/ML

## 2024-02-13 PROCEDURE — 84153 ASSAY OF PSA TOTAL: CPT

## 2024-03-19 ENCOUNTER — OFFICE VISIT (OUTPATIENT)
Dept: PRIMARY CARE | Facility: CLINIC | Age: 74
End: 2024-03-19
Payer: COMMERCIAL

## 2024-03-19 VITALS
WEIGHT: 92.6 LBS | OXYGEN SATURATION: 95 % | TEMPERATURE: 97.1 F | HEART RATE: 74 BPM | SYSTOLIC BLOOD PRESSURE: 122 MMHG | BODY MASS INDEX: 15.41 KG/M2 | DIASTOLIC BLOOD PRESSURE: 80 MMHG

## 2024-03-19 DIAGNOSIS — I25.10 ATHEROSCLEROSIS OF NATIVE CORONARY ARTERY OF NATIVE HEART WITHOUT ANGINA PECTORIS: ICD-10-CM

## 2024-03-19 DIAGNOSIS — E78.5 HYPERLIPIDEMIA, UNSPECIFIED HYPERLIPIDEMIA TYPE: ICD-10-CM

## 2024-03-19 DIAGNOSIS — G43.809 OTHER MIGRAINE WITHOUT STATUS MIGRAINOSUS, NOT INTRACTABLE: ICD-10-CM

## 2024-03-19 DIAGNOSIS — G47.33 OBSTRUCTIVE SLEEP APNEA SYNDROME: ICD-10-CM

## 2024-03-19 DIAGNOSIS — J44.9 CHRONIC OBSTRUCTIVE PULMONARY DISEASE, UNSPECIFIED COPD TYPE (MULTI): ICD-10-CM

## 2024-03-19 DIAGNOSIS — I38 VALVULAR HEART DISEASE: Primary | ICD-10-CM

## 2024-03-19 DIAGNOSIS — K21.9 GASTROESOPHAGEAL REFLUX DISEASE WITHOUT ESOPHAGITIS: ICD-10-CM

## 2024-03-19 DIAGNOSIS — E06.3 AUTOIMMUNE THYROIDITIS: ICD-10-CM

## 2024-03-19 PROCEDURE — 1124F ACP DISCUSS-NO DSCNMKR DOCD: CPT | Performed by: FAMILY MEDICINE

## 2024-03-19 PROCEDURE — 1160F RVW MEDS BY RX/DR IN RCRD: CPT | Performed by: FAMILY MEDICINE

## 2024-03-19 PROCEDURE — 1036F TOBACCO NON-USER: CPT | Performed by: FAMILY MEDICINE

## 2024-03-19 PROCEDURE — 99214 OFFICE O/P EST MOD 30 MIN: CPT | Performed by: FAMILY MEDICINE

## 2024-03-19 PROCEDURE — 1126F AMNT PAIN NOTED NONE PRSNT: CPT | Performed by: FAMILY MEDICINE

## 2024-03-19 PROCEDURE — 1159F MED LIST DOCD IN RCRD: CPT | Performed by: FAMILY MEDICINE

## 2024-03-19 ASSESSMENT — LIFESTYLE VARIABLES
HOW OFTEN DO YOU HAVE A DRINK CONTAINING ALCOHOL: NEVER
SKIP TO QUESTIONS 9-10: 1
HOW MANY STANDARD DRINKS CONTAINING ALCOHOL DO YOU HAVE ON A TYPICAL DAY: PATIENT DOES NOT DRINK
AUDIT-C TOTAL SCORE: 0
HOW OFTEN DO YOU HAVE SIX OR MORE DRINKS ON ONE OCCASION: NEVER

## 2024-03-19 ASSESSMENT — PAIN SCALES - GENERAL: PAINLEVEL: 0-NO PAIN

## 2024-03-19 ASSESSMENT — PATIENT HEALTH QUESTIONNAIRE - PHQ9
SUM OF ALL RESPONSES TO PHQ9 QUESTIONS 1 AND 2: 0
2. FEELING DOWN, DEPRESSED OR HOPELESS: NOT AT ALL
1. LITTLE INTEREST OR PLEASURE IN DOING THINGS: NOT AT ALL

## 2024-03-19 NOTE — PATIENT INSTRUCTIONS
Problem List Items Addressed This Visit             ICD-10-CM    Hyperlipidemia E78.5     - Will monitor cholesterol levels with blood work ordered         Relevant Orders    Comprehensive metabolic panel    Lipid panel    Tsh With Reflex To Free T4 If Abnormal    Atherosclerosis of native coronary artery of native heart without angina pectoris I25.10     - Continue with current medication regimen and follow-up in May with cardiology as scheduled  -Recommend completing panel of blood work prior to cardiology follow-up         Chronic obstructive pulmonary disease (CMS/[facility]) J44.9     - Continue with pulmonary follow-up and regimen as prescribed by specialist         Gastroesophageal reflux disease K21.9     - Stable on current regimen         Autoimmune thyroiditis E06.3     - Will monitor thyroid level blood work ordered         Relevant Orders    Comprehensive metabolic panel    Lipid panel    Tsh With Reflex To Free T4 If Abnormal    Migraine G43.909     - Stable         Obstructive sleep apnea syndrome G47.33     - Continue with CPAP/pulmonary follow-up per protocol         Valvular heart disease - Primary I38     - Continue to follow with established cardiologist per protocol          Prevnar 20 pneumonia vaccine advocated    Counseling:       Medication education:         Education:  The patient is counseled regarding potential side-effects of all new medications        Understanding:  Patient expressed understanding        Adherence:  No barriers to adherence identified

## 2024-03-19 NOTE — PROGRESS NOTES
Outpatient Visit Note    Chief Complaint   Patient presents with    Follow-up     6 month f/u         HPI:  Irving Sandoval is a 73 y.o. male with a past medical history significant for KELLI with COPD and prior history of esophageal cancer, acquired hypothyroidism, hyperlipidemia, valvular heart disease, migraines and osteoarthritis who presents to the office for 6-month follow-up.  He was last seen in the office on 9/21/2023 annual well exam.           Patient had establish care as a new patient in office on 09/27/2022, having previously been established with Dr. Richter.           Patient had panel blood work recently completed on 9/18/2023 including CMP, lipid panel, PSA and TSH. Blood work was remarkable for continued elevation of PSA going from 4.7 in October 2022 to 5.2 in March 2023 with level now at 5.9.  Patient was ultimately encouraged to have formal consultation with urology to which referral was given.  Has been seen in interval with last PSA checked in February which was 4.8.  MRI of prostate was additionally completed in January    Overall, he describes his health as fair with no reports of recent illness or hospitalization. He states that his diet is stable with no significant weight changes. In regards to physical activity, he notes that his physical activity is significantly limited by his COPD. He denies any significant sleep complaints. He denies issues of chest pain, shortness of breath, headaches, vision/hearing changes, abdominal pain, vomiting, diarrhea, melena, hematochezia, constipation or urinary symptoms. Does admit to recurrent episodes of GERD, though he continue with pantoprazole regimen. Denies any active dysphagia.           Hypothyroidism:  Patient has been managing with levothyroxine 50 mcg 1 tablet Monday through Saturday with 2 tablets on Sunday. Denies any major energy levels, jitteriness, palpitations, mood changes, bowel changes or hair/skin/nail concerns.           COPD:  Respiratory symptoms managed with Spiriva, Dulera and Ventolin. Has been on auto PAP/CPAP secondary to KELLI though intolerant to equipment. Denies any recent COPD exacerbations. Denies any current difficulty breathing, shortness of breath, wheeze or orthopnea, beyond patient's baseline exertional shortness of breath. He continues to follow with Dr. Joiner.  Did have chest CT completed in September 2023.    Patient additionally has history of valvular disease to which she follows with Dr. Nino.  He did have stress test completed in December 2023 which noted prior MI.  Does have occasional chest pain with exertion along with shortness of breath likely driven by COPD.  Was ultimately started on Imdur versus beta-blocker secondary to respiratory history.  Currently has plan for follow-up in May.    Lastly, patient has known history of esophageal cancer previously followed by Oncology.  Is scheduled for oncology follow-up in August    Preventative Health Maintenance:  In regards to preventative health maintenance, patient has historically declined vaccinations. Flu shot and pneumonia vaccine historically discussed and declined. Patient denies having prior colon cancer screening stating that he has had chronic diarrhea which restricts his ability to complete a Cologuard. Has had evaluation with Dr. Talley and put on Creon. Is unable to do colonoscopy as his respiratory state will not allow for anesthesia. Cologuard was attempted though sample provided was too loss. Notes that creon has helped with more formed bowel movements, though he would like to defer screening at this time.    Current Medications  Current Outpatient Medications   Medication Instructions    Dulera 200-5 mcg/actuation inhaler Every 12 hours    isosorbide mononitrate ER (IMDUR) 30 mg, oral, Daily, Do not crush or chew.    levothyroxine (Synthroid, Levoxyl) 50 mcg tablet TAKE 1 TABLET BY MOUTH EVERY MORNING MONDAY - SATURDAY AND TAKE 2 TABLETS  BY MOUTH ON     lipase/protease/amylase (CREON ORAL) oral, 3 times daily    oxygen (O2) gas therapy 1 each, inhalation, Continuous, At bedtime 2l oxygen    pantoprazole (ProtoNix) 40 mg EC tablet TAKE 1 TABLET BY MOUTH ONCE DAILY for 30    rosuvastatin (CRESTOR) 5 mg, oral, Daily    tiotropium (Spiriva Respimat) 2.5 mcg/actuation inhaler 1 capsule, inhalation, 2 times daily    Ventolin HFA 90 mcg/actuation inhaler Every 4 hours        Allergies  Allergies   Allergen Reactions    Penicillins Unknown        Past Medical History:   Diagnosis Date    COPD (chronic obstructive pulmonary disease) (CMS/[facility])     Hyperlipidemia, unspecified     Complex dyslipidemia    Hypothyroidism, unspecified     Hypothyroidism, adult    Lipid storage disorder, unspecified     Lipidosis    Migraine, unspecified, not intractable, without status migrainosus     Migraine    Obstructive sleep apnea (adult) (pediatric)     KELLI on CPAP    Other hypersomnia     Excessive daytime sleepiness    Personal history of malignant neoplasm of esophagus     History of malignant neoplasm of esophagus    Personal history of other diseases of the circulatory system     History of heart valve abnormality    Personal history of other diseases of the musculoskeletal system and connective tissue     Personal history of arthritis    Personal history of other endocrine, nutritional and metabolic disease     History of hyperlipidemia    Personal history of other endocrine, nutritional and metabolic disease     History of Graves' disease    Personal history of other specified conditions     History of fatigue    Radiculopathy, cervical region     Cervical radiculopathy, chronic    Sleep apnea, unspecified     Sleep-disordered breathing      History reviewed. No pertinent surgical history.  No family history on file.  Social History     Tobacco Use    Smoking status: Former     Types: Cigarettes     Quit date: 2014     Years since quittin.2     Passive  exposure: Past    Smokeless tobacco: Never   Vaping Use    Vaping Use: Never used   Substance Use Topics    Alcohol use: Never    Drug use: Never       ROS  All pertinent positive symptoms are included in the history of present illness.  All other systems have been reviewed and are negative and noncontributory to this patient's current ailments.    VITAL SIGNS  Vitals:    03/19/24 1347   BP: 122/80   Pulse: 74   Temp: 36.2 °C (97.1 °F)   SpO2: 95%       PHYSICAL EXAM  GENERAL APPEARANCE: alert and oriented, Pleasant and cooperative, No Acute Distress  HEENT: EOMI, PERRLA, MMM  HEART: RRR, normal S1S2, no murmurs, click or rubs  LUNGS: Grossly decreased breath sounds bilaterally, no wheezes/rhonchi/rales  EXTREMITIES: no edema, normal ROM  SKIN: normal, no rash, unremarkable  NEUROLOGIC EXAM: non-focal exam  MUSCULOSKELETAL: no gross abnormalities  PSYCH: affect is normal, eye contact is good    Assessment/Plan   Problem List Items Addressed This Visit             ICD-10-CM    Hyperlipidemia E78.5     - Will monitor cholesterol levels with blood work ordered         Relevant Orders    Comprehensive metabolic panel    Lipid panel    Tsh With Reflex To Free T4 If Abnormal    Atherosclerosis of native coronary artery of native heart without angina pectoris I25.10     - Continue with current medication regimen and follow-up in May with cardiology as scheduled  -Recommend completing panel of blood work prior to cardiology follow-up         Chronic obstructive pulmonary disease (CMS/HCC) J44.9     - Continue with pulmonary follow-up and regimen as prescribed by specialist         Gastroesophageal reflux disease K21.9     - Stable on current regimen         Autoimmune thyroiditis E06.3     - Will monitor thyroid level blood work ordered         Relevant Orders    Comprehensive metabolic panel    Lipid panel    Tsh With Reflex To Free T4 If Abnormal    Migraine G43.909     - Stable         Obstructive sleep apnea syndrome  G47.33     - Continue with CPAP/pulmonary follow-up per protocol         Valvular heart disease - Primary I38     - Continue to follow with established cardiologist per protocol          Prevnar 20 pneumonia vaccine advocated    Counseling:       Medication education:         Education:  The patient is counseled regarding potential side-effects of all new medications        Understanding:  Patient expressed understanding        Adherence:  No barriers to adherence identified

## 2024-03-19 NOTE — ASSESSMENT & PLAN NOTE
- Continue with current medication regimen and follow-up in May with cardiology as scheduled  -Recommend completing panel of blood work prior to cardiology follow-up

## 2024-04-16 DIAGNOSIS — E03.8 SUBCLINICAL HYPOTHYROIDISM: ICD-10-CM

## 2024-04-17 RX ORDER — LEVOTHYROXINE SODIUM 50 UG/1
TABLET ORAL
Qty: 102 TABLET | Refills: 1 | Status: SHIPPED | OUTPATIENT
Start: 2024-04-17

## 2024-04-22 ENCOUNTER — OFFICE VISIT (OUTPATIENT)
Dept: UROLOGY | Facility: CLINIC | Age: 74
End: 2024-04-22
Payer: COMMERCIAL

## 2024-04-22 ENCOUNTER — TELEPHONE (OUTPATIENT)
Dept: PRIMARY CARE | Facility: CLINIC | Age: 74
End: 2024-04-22

## 2024-04-22 DIAGNOSIS — R97.20 ELEVATED PSA: Primary | ICD-10-CM

## 2024-04-22 DIAGNOSIS — K59.00 CONSTIPATION, UNSPECIFIED CONSTIPATION TYPE: Primary | ICD-10-CM

## 2024-04-22 PROCEDURE — 99213 OFFICE O/P EST LOW 20 MIN: CPT | Performed by: STUDENT IN AN ORGANIZED HEALTH CARE EDUCATION/TRAINING PROGRAM

## 2024-04-22 PROCEDURE — 1159F MED LIST DOCD IN RCRD: CPT | Performed by: STUDENT IN AN ORGANIZED HEALTH CARE EDUCATION/TRAINING PROGRAM

## 2024-04-22 PROCEDURE — 1160F RVW MEDS BY RX/DR IN RCRD: CPT | Performed by: STUDENT IN AN ORGANIZED HEALTH CARE EDUCATION/TRAINING PROGRAM

## 2024-04-22 NOTE — TELEPHONE ENCOUNTER
Rx request received  Pharmacy populated  Last appmt 3/19/24    I do not see rx to populate. Please advise

## 2024-04-22 NOTE — PROGRESS NOTES
Subjective   Patient ID: Irving Sandoval is a 73 y.o. male.    HPI  Irving Sandoval is a 73 y.o. male who presents for elevated psa referred by Dr Todd. On oxygen, hx cardiac issues. Presents for MRI review. Latest PSA 4.8.      No LUTS. He is happy with urination.      No prior investigation of elevated PSA as per the patient     Cancer esophageal in 2015  No issue speeing  Fam hx father pelvic CA, Mother neck CA  Brother passed away from CA, unsure what type  Not sexually active.     1/12/24 MRI prostate.   IMPRESSION:  1.  BPH changes of the transition zone. Diffuse non nodular  hypointensities within the peripheral zone, without evidence of  focally restricted diffusion ( PI-RADS 2).      PI-RADS 2 - Low (clinically significant cancer is unlikely to be  present).    Lab Results   Component Value Date    PSA 4.80 (H) 02/13/2024    PSA 5.9 (H) 09/18/2023    PSA 5.2 (H) 03/02/2023    PSA 4.7 (H) 10/14/2022    PSA 3.7 09/28/2021    PSA 4.4 (H) 11/21/2019    PSA 5.0 (H) 06/12/2018       Review of Systems    Objective   Physical Exam    Assessment/Plan   Irving Sandoval is a 73 y.o. male who presents for elevated psa referred by Dr Todd. On oxygen, hx cardiac issues. Presents for MRI review. Latest PSA 4.8. MRI negative, PSA density 0.18. Discussed deferring biopsy because of his high medical risk.     No LUTS. He is happy with urination.      I personally reviewed his MRI which showed BPH changes. No evidence of malignancy, PI-RADS 2.      At this point, we discussed continuing to monitor PSA and deferring biopsy for now. He agrees with plan.     Plan:  PSA mid August 2024.   FUV after to review PSA.     Diagnoses and all orders for this visit:  Elevated PSA  -     PSA; Future    Scribe Attestation  By signing my name below, IMaribel Scribe attest that this documentation has been prepared under the direction and in the presence of Tj Coley MD.

## 2024-04-25 RX ORDER — AMOXICILLIN 250 MG
1 CAPSULE ORAL DAILY
Qty: 30 TABLET | Refills: 5 | Status: SHIPPED | OUTPATIENT
Start: 2024-04-25 | End: 2025-04-25

## 2024-05-21 ENCOUNTER — APPOINTMENT (OUTPATIENT)
Dept: CARDIOLOGY | Facility: CLINIC | Age: 74
End: 2024-05-21
Payer: COMMERCIAL

## 2024-05-22 ENCOUNTER — TELEPHONE (OUTPATIENT)
Dept: HEMATOLOGY/ONCOLOGY | Facility: CLINIC | Age: 74
End: 2024-05-22
Payer: COMMERCIAL

## 2024-05-22 DIAGNOSIS — K21.00 GASTROESOPHAGEAL REFLUX DISEASE WITH ESOPHAGITIS WITHOUT HEMORRHAGE: ICD-10-CM

## 2024-05-22 RX ORDER — PANTOPRAZOLE SODIUM 40 MG/1
TABLET, DELAYED RELEASE ORAL
Qty: 30 TABLET | Refills: 3 | Status: SHIPPED | OUTPATIENT
Start: 2024-05-22

## 2024-05-23 ENCOUNTER — OFFICE VISIT (OUTPATIENT)
Dept: CARDIOLOGY | Facility: CLINIC | Age: 74
End: 2024-05-23
Payer: COMMERCIAL

## 2024-05-23 VITALS
WEIGHT: 97 LBS | HEART RATE: 66 BPM | OXYGEN SATURATION: 96 % | SYSTOLIC BLOOD PRESSURE: 101 MMHG | BODY MASS INDEX: 16.14 KG/M2 | DIASTOLIC BLOOD PRESSURE: 70 MMHG

## 2024-05-23 DIAGNOSIS — E78.5 HYPERLIPIDEMIA, UNSPECIFIED HYPERLIPIDEMIA TYPE: ICD-10-CM

## 2024-05-23 DIAGNOSIS — I25.10 ATHEROSCLEROSIS OF NATIVE CORONARY ARTERY OF NATIVE HEART WITHOUT ANGINA PECTORIS: Primary | ICD-10-CM

## 2024-05-23 PROCEDURE — 1036F TOBACCO NON-USER: CPT | Performed by: INTERNAL MEDICINE

## 2024-05-23 PROCEDURE — 1160F RVW MEDS BY RX/DR IN RCRD: CPT | Performed by: INTERNAL MEDICINE

## 2024-05-23 PROCEDURE — 99213 OFFICE O/P EST LOW 20 MIN: CPT | Performed by: INTERNAL MEDICINE

## 2024-05-23 PROCEDURE — 1126F AMNT PAIN NOTED NONE PRSNT: CPT | Performed by: INTERNAL MEDICINE

## 2024-05-23 PROCEDURE — 1159F MED LIST DOCD IN RCRD: CPT | Performed by: INTERNAL MEDICINE

## 2024-05-23 ASSESSMENT — PAIN SCALES - GENERAL: PAINLEVEL: 0-NO PAIN

## 2024-05-23 ASSESSMENT — ENCOUNTER SYMPTOMS
DEPRESSION: 0
LOSS OF SENSATION IN FEET: 0
OCCASIONAL FEELINGS OF UNSTEADINESS: 0

## 2024-05-23 ASSESSMENT — PATIENT HEALTH QUESTIONNAIRE - PHQ9
2. FEELING DOWN, DEPRESSED OR HOPELESS: NOT AT ALL
1. LITTLE INTEREST OR PLEASURE IN DOING THINGS: NOT AT ALL
SUM OF ALL RESPONSES TO PHQ9 QUESTIONS 1 AND 2: 0

## 2024-05-23 NOTE — PROGRESS NOTES
Subjective      Chief Complaint   Patient presents with    Follow-up          He has a history of high cardiac calcium score as well as COPD from smoking.  He does have hypercholesterolemia.  He underwent a stress test beginning of the year which showed ejection fraction of 85%.  Normal wall motion with nontransmural area of the septum which did not perfuse and small rolf-infarction ischemia.  He had some discomfort last time he was seen he was placed on Imdur held the beta-blocker due to his lungs.  If you are continuing of discomforts recommend a heart catheterization  He is active and no discomforts.  He is not complaining of chest discomfort.  NO PND or orthopnea.  The legs are not swelling on him.  He does not complain of palpitations.  The breathing is alright unless he does something  and is not smoking  the BP is doing well  The chol has been good           ROS     History reviewed. No pertinent surgical history.     Active Ambulatory Problems     Diagnosis Date Noted    Hyperlipidemia 10/13/2023    Atherosclerosis of native coronary artery of native heart without angina pectoris 10/23/2023    Arthritis 10/27/2023    Cervical radiculopathy 10/27/2023    Chronic obstructive pulmonary disease (Multi) 10/27/2023    Cluster headache syndrome 10/27/2023    Complex dyslipidemia 10/27/2023    Esophageal cancer (Multi) 10/27/2023    Excessive daytime sleepiness 10/27/2023    Fatigue 10/27/2023    Gastroesophageal reflux disease 10/27/2023    Autoimmune thyroiditis 10/27/2023    Iatrogenic hyperthyroidism 10/27/2023    Lipidosis 10/27/2023    Migraine 10/27/2023    Migraine without aura and without status migrainosus, not intractable 10/27/2023    Mildly underweight adult 10/27/2023    Obstructive sleep apnea syndrome 10/27/2023    Sleep-disordered breathing 10/27/2023    Secondary pancreatic insufficiency (HHS-HCC) 10/27/2023    Subclinical hyperthyroidism 10/27/2023    Subclinical hypothyroidism 10/27/2023     Valvular heart disease 10/27/2023     Resolved Ambulatory Problems     Diagnosis Date Noted    No Resolved Ambulatory Problems     Past Medical History:   Diagnosis Date    COPD (chronic obstructive pulmonary disease) (Multi)     Hyperlipidemia, unspecified     Hypothyroidism, unspecified     Lipid storage disorder, unspecified     Migraine, unspecified, not intractable, without status migrainosus     Obstructive sleep apnea (adult) (pediatric)     Other hypersomnia     Personal history of malignant neoplasm of esophagus     Personal history of other diseases of the circulatory system     Personal history of other diseases of the musculoskeletal system and connective tissue     Personal history of other endocrine, nutritional and metabolic disease     Personal history of other endocrine, nutritional and metabolic disease     Personal history of other specified conditions     Radiculopathy, cervical region     Sleep apnea, unspecified         Visit Vitals  /70   Pulse 66   Wt (!) 44 kg (97 lb)   SpO2 96%   BMI 16.14 kg/m²   Smoking Status Former   BSA 1.42 m²        Objective     Constitutional:       Appearance: Healthy appearance.   Eyes:      Pupils: Pupils are equal, round, and reactive to light.   Neck:      Vascular: No JVR. JVD normal.   Pulmonary:      Effort: Pulmonary effort is normal.      Breath sounds: Normal breath sounds.   Cardiovascular:      PMI at left midclavicular line. Normal rate. Regular rhythm. Normal S1. Normal S2.       Murmurs: There is no murmur.      No gallop.  No click. No rub.   Pulses:     Intact distal pulses.   Edema:     Peripheral edema absent.   Abdominal:      Palpations: Abdomen is soft.      Tenderness: There is no abdominal tenderness.   Musculoskeletal: Normal range of motion.      Extremities: No clubbing present.Skin:     General: Skin is warm and dry.   Neurological:      General: No focal deficit present.            Lab Review:         Lab Results   Component  "Value Date    CHOL 172 09/18/2023    CHOL 159 10/14/2022    CHOL 191 09/28/2021     Lab Results   Component Value Date    HDL 77 09/18/2023    HDL 57 10/14/2022    HDL 75 09/28/2021     Lab Results   Component Value Date    LDLCALC 86 09/18/2023    LDLCALC 85 10/14/2022    LDLCALC 102 09/28/2021     Lab Results   Component Value Date    TRIG 45 09/18/2023    TRIG 87 10/14/2022    TRIG 70 09/28/2021     No components found for: \"CHOLHDL\"     Assessment/Plan     Atherosclerosis of native coronary artery of native heart without angina pectoris  He is doing well and no angina and no chf.  The meds seem to be helping with the angina.  Will continue as is and will see in 6 months    Hyperlipidemia  Is doing well     "

## 2024-05-23 NOTE — ASSESSMENT & PLAN NOTE
He is doing well and no angina and no chf.  The meds seem to be helping with the angina.  Will continue as is and will see in 6 months

## 2024-07-25 DIAGNOSIS — I25.10 ATHEROSCLEROSIS OF NATIVE CORONARY ARTERY OF NATIVE HEART WITHOUT ANGINA PECTORIS: ICD-10-CM

## 2024-07-25 DIAGNOSIS — E78.5 HYPERLIPIDEMIA, UNSPECIFIED HYPERLIPIDEMIA TYPE: ICD-10-CM

## 2024-07-25 RX ORDER — ROSUVASTATIN CALCIUM 5 MG/1
5 TABLET, COATED ORAL DAILY
Qty: 90 TABLET | Refills: 1 | Status: SHIPPED | OUTPATIENT
Start: 2024-07-25 | End: 2025-01-21

## 2024-07-25 NOTE — TELEPHONE ENCOUNTER
Pt is out of meds,     LV: 03/19/24     FV: not scheduled    Refill:  rosuvastatin (Crestor) 5 mg tablet Take 1 tablet (5 mg) by mouth once daily.     [facility]    Pt is going to get his bw done in Aug, along with another doctors bw orders.

## 2024-08-19 ENCOUNTER — OFFICE VISIT (OUTPATIENT)
Dept: HEMATOLOGY/ONCOLOGY | Facility: CLINIC | Age: 74
End: 2024-08-19
Payer: COMMERCIAL

## 2024-08-19 VITALS
SYSTOLIC BLOOD PRESSURE: 118 MMHG | BODY MASS INDEX: 16.05 KG/M2 | HEART RATE: 63 BPM | OXYGEN SATURATION: 96 % | TEMPERATURE: 97.1 F | DIASTOLIC BLOOD PRESSURE: 73 MMHG | HEIGHT: 64 IN | WEIGHT: 94.03 LBS | RESPIRATION RATE: 17 BRPM

## 2024-08-19 DIAGNOSIS — C15.9 MALIGNANT NEOPLASM OF ESOPHAGUS, UNSPECIFIED LOCATION (MULTI): Primary | ICD-10-CM

## 2024-08-19 PROCEDURE — 1159F MED LIST DOCD IN RCRD: CPT | Performed by: NURSE PRACTITIONER

## 2024-08-19 PROCEDURE — 3008F BODY MASS INDEX DOCD: CPT | Performed by: NURSE PRACTITIONER

## 2024-08-19 PROCEDURE — 99213 OFFICE O/P EST LOW 20 MIN: CPT | Performed by: NURSE PRACTITIONER

## 2024-08-19 PROCEDURE — 1126F AMNT PAIN NOTED NONE PRSNT: CPT | Performed by: NURSE PRACTITIONER

## 2024-08-19 ASSESSMENT — PATIENT HEALTH QUESTIONNAIRE - PHQ9
1. LITTLE INTEREST OR PLEASURE IN DOING THINGS: NOT AT ALL
SUM OF ALL RESPONSES TO PHQ9 QUESTIONS 1 AND 2: 0
2. FEELING DOWN, DEPRESSED OR HOPELESS: NOT AT ALL

## 2024-08-19 ASSESSMENT — PAIN SCALES - GENERAL: PAINLEVEL: 0-NO PAIN

## 2024-08-19 ASSESSMENT — COLUMBIA-SUICIDE SEVERITY RATING SCALE - C-SSRS
1. IN THE PAST MONTH, HAVE YOU WISHED YOU WERE DEAD OR WISHED YOU COULD GO TO SLEEP AND NOT WAKE UP?: NO
6. HAVE YOU EVER DONE ANYTHING, STARTED TO DO ANYTHING, OR PREPARED TO DO ANYTHING TO END YOUR LIFE?: NO
2. HAVE YOU ACTUALLY HAD ANY THOUGHTS OF KILLING YOURSELF?: NO

## 2024-08-19 NOTE — PROGRESS NOTES
Patient here today for follow up. Per patient, he had a mild heart attack in May. No intervention was deemed necessary, he was started on isosorbide.     Some SOB with exertion, not a new issue and he doesn't feel it's worsening.   Wears 2L oxygen HS.     Medications reviewed with patient.     Follow up as needed.

## 2024-08-19 NOTE — PROGRESS NOTES
"Patient ID: Irving Sandoval is a 73 y.o. male.    Cancer History:  1. 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 He began concurrent carboplatin, paclitaxel and radiation therapy November 10, 2014 and completed   radiation therapy on 2014. He then underwent esophagectomy with Dr. Marc Hendricks on 2015.     2. 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 He was previously followed  here by Dr Yang.   On presentation today he reports he is doing well.  He denies any fever, chills or night sweats.  No cough or chest pain.  No nausea or vomiting.  No constipation or diarrhea.    In regards to his history of a esophagectomy he reports there are no foods he  is unable to eat.      He continues to follow with Dr. Joiner for his chronic shortness of breath.   He no longer uses his C-pap over night.      Review of Systems:  A review of systems has been completed and are negative for complaints except what is stated in the HPI and/or past medical history    Allergies:  penicillin     Medications:  Dulera, isosorbide mononitrate, levothyroxine, creon, pantoprazole (taking PRN), rosuvastatin, Spiriva, Ventolin    Past Medical History:  Adenocarcinoma of gastroesophageal junction, GERD, pancreatic insufficiency     Past Surgical History:  esophagectomy     Family History:  Patient reports his brother has recently  from \"bone cancer\" both his mother and father also  of cancer but he does not know any primary     Social History:  Quit smoking in  (1 PPD)  No alcohol since  (heavy daily whisky)  [email or web address]       Vital Signs:  /73 (BP Location: Left arm, Patient Position: Sitting, BP Cuff Size: Adult)   Pulse " "63   Temp 36.2 °C (97.1 °F) (Temporal)   Resp 17   Ht (S) 1.629 m (5' 4.13\")   Wt (!) 42.7 kg (94 lb 0.4 oz)   SpO2 96%   BMI 16.07 kg/m²     Physical Exam:  ECO  Pain: 0  Constitutional: Well developed, awake/alert/oriented x3, no distress, alert and cooperative  Eyes: PER. sclera anicteric  ENMT: Oral mucosa moist  Respiratory/Thorax: Breathing is non-labored. Lungs are clear to auscultation bilaterally. No adventitious breath sounds  Cardiovascular: S1-S2. Regular rate and rhythm. No murmurs, rubs, or gallops appreciated  Gastrointestinal: Abdomen soft nontender, nondistended, normal active bowel sounds.   Musculoskeletal: ROM intact, no joint swelling, normal strength  Extremities: normal extremities, no cyanosis, no edema, no clubbing  Neurologic: alert and oriented x3. Nonfocal exam. No myoclonus  Psychological: Pleasant, appropriate and easily engaged     Assessment:  Adenocarcinoma of the GE junction -  -  He is now 9.5 years from  diagnosis and treatment, no s/s to suggest disease reoccurrence   - Ongoing issues with his hernia of which no surgical intervention is planned.   He will continue following with Dr. Richter and Dr. Joiner.   -Pantoprazole can be ordered by primary care, he is taking PRN     Plan:  Follow-up as needed    ROXY Romo-CNP    "

## 2024-08-20 ENCOUNTER — LAB (OUTPATIENT)
Dept: LAB | Facility: LAB | Age: 74
End: 2024-08-20
Payer: COMMERCIAL

## 2024-08-20 DIAGNOSIS — C15.9 MALIGNANT NEOPLASM OF ESOPHAGUS, UNSPECIFIED LOCATION (MULTI): ICD-10-CM

## 2024-08-20 DIAGNOSIS — E78.5 HYPERLIPIDEMIA, UNSPECIFIED HYPERLIPIDEMIA TYPE: ICD-10-CM

## 2024-08-20 DIAGNOSIS — E06.3 AUTOIMMUNE THYROIDITIS: ICD-10-CM

## 2024-08-20 DIAGNOSIS — R97.20 ELEVATED PSA: ICD-10-CM

## 2024-08-20 LAB
ALBUMIN SERPL-MCNC: 4 G/DL (ref 3.5–5)
ALP BLD-CCNC: 59 U/L (ref 35–125)
ALT SERPL-CCNC: 15 U/L (ref 5–40)
ANION GAP SERPL CALC-SCNC: 12 MMOL/L
AST SERPL-CCNC: 24 U/L (ref 5–40)
BASOPHILS # BLD AUTO: 0.07 X10*3/UL (ref 0–0.1)
BASOPHILS NFR BLD AUTO: 1.2 %
BILIRUB SERPL-MCNC: 0.5 MG/DL (ref 0.1–1.2)
BUN SERPL-MCNC: 12 MG/DL (ref 8–25)
CALCIUM SERPL-MCNC: 9.3 MG/DL (ref 8.5–10.4)
CHLORIDE SERPL-SCNC: 98 MMOL/L (ref 97–107)
CHOLEST SERPL-MCNC: 161 MG/DL (ref 133–200)
CHOLEST/HDLC SERPL: 1.9 {RATIO}
CO2 SERPL-SCNC: 24 MMOL/L (ref 24–31)
CREAT SERPL-MCNC: 1 MG/DL (ref 0.4–1.6)
EGFRCR SERPLBLD CKD-EPI 2021: 80 ML/MIN/1.73M*2
EOSINOPHIL # BLD AUTO: 0.2 X10*3/UL (ref 0–0.4)
EOSINOPHIL NFR BLD AUTO: 3.5 %
ERYTHROCYTE [DISTWIDTH] IN BLOOD BY AUTOMATED COUNT: 16.6 % (ref 11.5–14.5)
GLUCOSE SERPL-MCNC: 92 MG/DL (ref 65–99)
HCT VFR BLD AUTO: 39.7 % (ref 41–52)
HDLC SERPL-MCNC: 86 MG/DL
HGB BLD-MCNC: 12.9 G/DL (ref 13.5–17.5)
IMM GRANULOCYTES # BLD AUTO: 0.01 X10*3/UL (ref 0–0.5)
IMM GRANULOCYTES NFR BLD AUTO: 0.2 % (ref 0–0.9)
LDLC SERPL CALC-MCNC: 66 MG/DL (ref 65–130)
LYMPHOCYTES # BLD AUTO: 2.14 X10*3/UL (ref 0.8–3)
LYMPHOCYTES NFR BLD AUTO: 37 %
MCH RBC QN AUTO: 29.1 PG (ref 26–34)
MCHC RBC AUTO-ENTMCNC: 32.5 G/DL (ref 32–36)
MCV RBC AUTO: 90 FL (ref 80–100)
MONOCYTES # BLD AUTO: 0.55 X10*3/UL (ref 0.05–0.8)
MONOCYTES NFR BLD AUTO: 9.5 %
NEUTROPHILS # BLD AUTO: 2.81 X10*3/UL (ref 1.6–5.5)
NEUTROPHILS NFR BLD AUTO: 48.6 %
NRBC BLD-RTO: 0 /100 WBCS (ref 0–0)
PLATELET # BLD AUTO: 260 X10*3/UL (ref 150–450)
POTASSIUM SERPL-SCNC: 4.3 MMOL/L (ref 3.4–5.1)
PROT SERPL-MCNC: 6.5 G/DL (ref 5.9–7.9)
PSA SERPL-MCNC: 4.2 NG/ML
RBC # BLD AUTO: 4.43 X10*6/UL (ref 4.5–5.9)
SODIUM SERPL-SCNC: 134 MMOL/L (ref 133–145)
T4 FREE SERPL-MCNC: 1.5 NG/DL (ref 0.9–1.7)
TRIGL SERPL-MCNC: 44 MG/DL (ref 40–150)
TSH SERPL DL<=0.05 MIU/L-ACNC: 4.66 MIU/L (ref 0.27–4.2)
WBC # BLD AUTO: 5.8 X10*3/UL (ref 4.4–11.3)

## 2024-08-20 PROCEDURE — 82728 ASSAY OF FERRITIN: CPT

## 2024-08-20 PROCEDURE — 85025 COMPLETE CBC W/AUTO DIFF WBC: CPT

## 2024-08-20 PROCEDURE — 80061 LIPID PANEL: CPT

## 2024-08-20 PROCEDURE — 36415 COLL VENOUS BLD VENIPUNCTURE: CPT

## 2024-08-20 PROCEDURE — 80053 COMPREHEN METABOLIC PANEL: CPT

## 2024-08-20 PROCEDURE — 84153 ASSAY OF PSA TOTAL: CPT

## 2024-08-20 PROCEDURE — 84443 ASSAY THYROID STIM HORMONE: CPT

## 2024-08-20 PROCEDURE — 84439 ASSAY OF FREE THYROXINE: CPT

## 2024-08-20 PROCEDURE — 83540 ASSAY OF IRON: CPT

## 2024-08-20 PROCEDURE — 83550 IRON BINDING TEST: CPT

## 2024-08-22 ENCOUNTER — TELEPHONE (OUTPATIENT)
Dept: HEMATOLOGY/ONCOLOGY | Facility: CLINIC | Age: 74
End: 2024-08-22
Payer: COMMERCIAL

## 2024-08-23 ENCOUNTER — TELEPHONE (OUTPATIENT)
Dept: PRIMARY CARE | Facility: CLINIC | Age: 74
End: 2024-08-23
Payer: COMMERCIAL

## 2024-08-23 DIAGNOSIS — K21.00 GASTROESOPHAGEAL REFLUX DISEASE WITH ESOPHAGITIS WITHOUT HEMORRHAGE: ICD-10-CM

## 2024-08-23 DIAGNOSIS — E78.5 HYPERLIPIDEMIA, UNSPECIFIED HYPERLIPIDEMIA TYPE: ICD-10-CM

## 2024-08-23 DIAGNOSIS — I25.10 ATHEROSCLEROSIS OF NATIVE CORONARY ARTERY OF NATIVE HEART WITHOUT ANGINA PECTORIS: ICD-10-CM

## 2024-08-23 DIAGNOSIS — E03.9 HYPOTHYROIDISM, UNSPECIFIED TYPE: Primary | ICD-10-CM

## 2024-08-23 NOTE — TELEPHONE ENCOUNTER
Pt is aware of lab results, also has a question about the Levothyroxine changes (? INCREASE TO 75 MCG DAILY ? OR M-SAT  ON SUN? ) Please advise and call back [phone]    Refill:  pantoprazole (ProtoNix) 40 mg EC tablet TAKE 1 TABLET BY MOUTH ONCE DAILY for 30     rosuvastatin (Crestor) 5 mg tablet Take 1 tablet (5 mg) by mouth once daily.     ? INCREASE TO 75 MCG DAILY ? OR M-SAT  ON SUN?   levothyroxine (Synthroid, Levoxyl) 50 mcg tablet TAKE 1 TABLET BY MOUTH EVERY MORNING MONDAY THROUGH SATURDAY AND 2 TABLETS ON SUNDAY     [facility]:  [facility]

## 2024-08-26 ENCOUNTER — TELEPHONE (OUTPATIENT)
Dept: HEMATOLOGY/ONCOLOGY | Facility: CLINIC | Age: 74
End: 2024-08-26

## 2024-08-26 ENCOUNTER — APPOINTMENT (OUTPATIENT)
Dept: UROLOGY | Facility: CLINIC | Age: 74
End: 2024-08-26
Payer: COMMERCIAL

## 2024-08-26 DIAGNOSIS — D64.9 ANEMIA, UNSPECIFIED TYPE: Primary | ICD-10-CM

## 2024-08-26 DIAGNOSIS — D51.9 ANEMIA DUE TO VITAMIN B12 DEFICIENCY, UNSPECIFIED B12 DEFICIENCY TYPE: ICD-10-CM

## 2024-08-26 DIAGNOSIS — R97.20 ELEVATED PSA: Primary | ICD-10-CM

## 2024-08-26 LAB
FERRITIN SERPL-MCNC: 22 NG/ML (ref 30–400)
IRON SATN MFR SERPL: 16 % (ref 12–50)
IRON SERPL-MCNC: 57 UG/DL (ref 45–160)
TIBC SERPL-MCNC: 360 UG/DL (ref 228–428)
UIBC SERPL-MCNC: 303 UG/DL (ref 110–370)

## 2024-08-26 PROCEDURE — 99212 OFFICE O/P EST SF 10 MIN: CPT | Performed by: STUDENT IN AN ORGANIZED HEALTH CARE EDUCATION/TRAINING PROGRAM

## 2024-08-26 NOTE — PROGRESS NOTES
Subjective   Patient ID: Irving Sandoval is a 73 y.o. male.    HPI  Irving Sandoval is a 73 y.o. male who presents for FUV elevated psa referred by Dr Todd. Off oxygen, hx cardiac issues. PSA 4.2. Last MRI showed BPH changes, PI-RADS 2.     No LUTS currently. Doing well overall.     1/12/24 MRI prostate.   IMPRESSION:  1.  BPH changes of the transition zone. Diffuse non nodular  hypointensities within the peripheral zone, without evidence of  focally restricted diffusion ( PI-RADS 2).      PI-RADS 2 - Low (clinically significant cancer is unlikely to be  present).    Lab Results   Component Value Date    PSA 4.20 (H) 08/20/2024    PSA 4.80 (H) 02/13/2024    PSA 5.9 (H) 09/18/2023    PSA 5.2 (H) 03/02/2023    PSA 4.7 (H) 10/14/2022    PSA 3.7 09/28/2021    PSA 4.4 (H) 11/21/2019       Review of Systems    Objective   Physical Exam    Assessment/Plan   Irving Sandoval is a 73 y.o. male who presents for FUV elevated psa referred by Dr Todd. Off oxygen, hx cardiac issues. PSA 4.2. Last MRI showed BPH changes, PI-RADS 2.     No LUTS currently. Doing well overall.     We will continue to monitor PSA every 6 months. Agrees with plan.     Plan:  PSA 6 months, Feb 2025  FUV 6 months. March 2025.     Diagnoses and all orders for this visit:  Elevated PSA  -     Prostate Specific Antigen; Future    Scribe Attestation  By signing my name below, IMaribel Scribe attest that this documentation has been prepared under the direction and in the presence of Tj Coley MD.

## 2024-08-26 NOTE — PROGRESS NOTES
Spoke to patient. Mild anemia. Previous B12 deficiency. Will have him start B12 1000mcg daily.   Recheck CBCd and B12 level in February.  Will add iron panel and Ferritin labs now

## 2024-08-27 RX ORDER — PANTOPRAZOLE SODIUM 40 MG/1
TABLET, DELAYED RELEASE ORAL
Qty: 30 TABLET | Refills: 1 | Status: SHIPPED | OUTPATIENT
Start: 2024-08-27

## 2024-08-27 RX ORDER — LEVOTHYROXINE SODIUM 75 UG/1
75 TABLET ORAL DAILY
Qty: 90 TABLET | Refills: 1 | Status: SHIPPED | OUTPATIENT
Start: 2024-08-27 | End: 2025-02-23

## 2024-08-27 RX ORDER — ROSUVASTATIN CALCIUM 5 MG/1
5 TABLET, COATED ORAL DAILY
Qty: 90 TABLET | Refills: 1 | Status: SHIPPED | OUTPATIENT
Start: 2024-08-27 | End: 2025-02-23

## 2024-09-27 ENCOUNTER — HOSPITAL ENCOUNTER (OUTPATIENT)
Dept: RADIOLOGY | Facility: HOSPITAL | Age: 74
Discharge: HOME | End: 2024-09-27
Payer: COMMERCIAL

## 2024-09-27 DIAGNOSIS — F17.201 NICOTINE DEPENDENCE, UNSPECIFIED, IN REMISSION: ICD-10-CM

## 2024-09-27 PROCEDURE — 71271 CT THORAX LUNG CANCER SCR C-: CPT

## 2024-11-06 DIAGNOSIS — K21.00 GASTROESOPHAGEAL REFLUX DISEASE WITH ESOPHAGITIS WITHOUT HEMORRHAGE: ICD-10-CM

## 2024-11-06 RX ORDER — PANTOPRAZOLE SODIUM 40 MG/1
TABLET, DELAYED RELEASE ORAL
Qty: 90 TABLET | Refills: 0 | Status: SHIPPED | OUTPATIENT
Start: 2024-11-06

## 2024-11-06 NOTE — TELEPHONE ENCOUNTER
Prescription refill sent per patient is due for follow-up.  Would encourage him to schedule follow-up prior to next refill

## 2024-11-14 ENCOUNTER — APPOINTMENT (OUTPATIENT)
Dept: CARDIOLOGY | Facility: CLINIC | Age: 74
End: 2024-11-14
Payer: COMMERCIAL

## 2024-11-24 NOTE — PROGRESS NOTES
Subjective      Chief Complaint   Patient presents with    6 month follow up          He has a history of high cardiac calcium score as well as a COPD from his smoking.  He does have a history of hypercholesterolemia.  He underwent a stress test in December 2023 showing EF of 83% normal wall motion nontransmural area of the septum which did not perfuse in the small rolf-infarction ischemia.  He was placed on Imdur the beta-blockers were discontinued due to his lungs.  He remains active and is sitting all day watching TV.  He is not complaining of chest discomfort.  NO PND or orthopnea.  The legs are not swelling on him.  He does not complain of palpitations. The sob is the same  Says is not smoking  The BP is doing well  The chol is doing well           ROS     History reviewed. No pertinent surgical history.     Active Ambulatory Problems     Diagnosis Date Noted    Hyperlipidemia 10/13/2023    Atherosclerosis of native coronary artery of native heart without angina pectoris 10/23/2023    Arthritis 10/27/2023    Cervical radiculopathy 10/27/2023    Chronic obstructive pulmonary disease (Multi) 10/27/2023    Cluster headache syndrome 10/27/2023    Complex dyslipidemia 10/27/2023    Esophageal cancer (Multi) 10/27/2023    Excessive daytime sleepiness 10/27/2023    Fatigue 10/27/2023    Gastroesophageal reflux disease 10/27/2023    Autoimmune thyroiditis 10/27/2023    Iatrogenic hyperthyroidism 10/27/2023    Lipidosis 10/27/2023    Migraine 10/27/2023    Migraine without aura and without status migrainosus, not intractable 10/27/2023    Mildly underweight adult 10/27/2023    Obstructive sleep apnea syndrome 10/27/2023    Sleep-disordered breathing 10/27/2023    Secondary pancreatic insufficiency (HHS-HCC) 10/27/2023    Subclinical hyperthyroidism 10/27/2023    Subclinical hypothyroidism 10/27/2023    Valvular heart disease 10/27/2023     Resolved Ambulatory Problems     Diagnosis Date Noted    No Resolved Ambulatory  Problems     Past Medical History:   Diagnosis Date    COPD (chronic obstructive pulmonary disease) (Multi)     Hyperlipidemia, unspecified     Hypothyroidism, unspecified     Lipid storage disorder, unspecified     Migraine, unspecified, not intractable, without status migrainosus     Obstructive sleep apnea (adult) (pediatric)     Other hypersomnia     Personal history of malignant neoplasm of esophagus     Personal history of other diseases of the circulatory system     Personal history of other diseases of the musculoskeletal system and connective tissue     Personal history of other endocrine, nutritional and metabolic disease     Personal history of other endocrine, nutritional and metabolic disease     Personal history of other specified conditions     Radiculopathy, cervical region     Sleep apnea, unspecified         Visit Vitals  BP 99/82   Pulse 74   Wt (!) 42.4 kg (93 lb 8 oz)   SpO2 95%   BMI 15.98 kg/m²   Smoking Status Former   BSA 1.39 m²        Objective     Constitutional:       Appearance: Healthy appearance.   Eyes:      Pupils: Pupils are equal, round, and reactive to light.   Neck:      Vascular: No JVR. JVD normal.   Pulmonary:      Effort: Pulmonary effort is normal.      Breath sounds: Normal breath sounds.   Cardiovascular:      PMI at left midclavicular line. Normal rate. Regular rhythm. Normal S1. Normal S2.       Murmurs: There is no murmur.      No gallop.  No click. No rub.   Pulses:     Intact distal pulses.   Edema:     Peripheral edema absent.   Abdominal:      Palpations: Abdomen is soft.      Tenderness: There is no abdominal tenderness.   Musculoskeletal: Normal range of motion.      Extremities: No clubbing present.Skin:     General: Skin is warm and dry.   Neurological:      General: No focal deficit present.            Lab Review:         Lab Results   Component Value Date    CHOL 161 08/20/2024    CHOL 172 09/18/2023    CHOL 159 10/14/2022     Lab Results   Component Value  "Date    HDL 86.0 08/20/2024    HDL 77 09/18/2023    HDL 57 10/14/2022     Lab Results   Component Value Date    LDLCALC 66 08/20/2024    LDLCALC 86 09/18/2023    LDLCALC 85 10/14/2022     Lab Results   Component Value Date    TRIG 44 08/20/2024    TRIG 45 09/18/2023    TRIG 87 10/14/2022     No components found for: \"CHOLHDL\"     Assessment/Plan     Atherosclerosis of native coronary artery of native heart without angina pectoris  He is doing well and is not active.  NO angina and no chf.  He is not smoking    Hyperlipidemia  The chol is controlled     "

## 2024-11-25 ENCOUNTER — OFFICE VISIT (OUTPATIENT)
Dept: CARDIOLOGY | Facility: CLINIC | Age: 74
End: 2024-11-25
Payer: COMMERCIAL

## 2024-11-25 VITALS
OXYGEN SATURATION: 95 % | SYSTOLIC BLOOD PRESSURE: 99 MMHG | BODY MASS INDEX: 15.98 KG/M2 | WEIGHT: 93.5 LBS | DIASTOLIC BLOOD PRESSURE: 82 MMHG | HEART RATE: 74 BPM

## 2024-11-25 DIAGNOSIS — I25.10 ATHEROSCLEROSIS OF NATIVE CORONARY ARTERY OF NATIVE HEART WITHOUT ANGINA PECTORIS: Primary | ICD-10-CM

## 2024-11-25 DIAGNOSIS — E78.5 HYPERLIPIDEMIA, UNSPECIFIED HYPERLIPIDEMIA TYPE: ICD-10-CM

## 2024-11-25 PROCEDURE — 1126F AMNT PAIN NOTED NONE PRSNT: CPT | Performed by: INTERNAL MEDICINE

## 2024-11-25 PROCEDURE — 99213 OFFICE O/P EST LOW 20 MIN: CPT | Performed by: INTERNAL MEDICINE

## 2024-11-25 PROCEDURE — 1036F TOBACCO NON-USER: CPT | Performed by: INTERNAL MEDICINE

## 2024-11-25 PROCEDURE — 1159F MED LIST DOCD IN RCRD: CPT | Performed by: INTERNAL MEDICINE

## 2024-11-25 ASSESSMENT — ENCOUNTER SYMPTOMS
OCCASIONAL FEELINGS OF UNSTEADINESS: 0
DEPRESSION: 0
LOSS OF SENSATION IN FEET: 0

## 2024-11-25 ASSESSMENT — PAIN SCALES - GENERAL: PAINLEVEL_OUTOF10: 0-NO PAIN

## 2024-11-25 NOTE — ASSESSMENT & PLAN NOTE
He is doing well and is not active.  NO angina and no chf.  He is not smoking  
The chol is controlled  
bedside/will do it later

## 2024-12-11 DIAGNOSIS — I25.10 CORONARY ATHEROSCLEROSIS DUE TO CALCIFIED CORONARY LESION: ICD-10-CM

## 2024-12-11 DIAGNOSIS — I25.84 CORONARY ATHEROSCLEROSIS DUE TO CALCIFIED CORONARY LESION: ICD-10-CM

## 2024-12-11 RX ORDER — ISOSORBIDE MONONITRATE 30 MG/1
30 TABLET, EXTENDED RELEASE ORAL DAILY
Qty: 90 TABLET | Refills: 3 | Status: SHIPPED | OUTPATIENT
Start: 2024-12-11 | End: 2025-12-11

## 2024-12-19 ENCOUNTER — LAB (OUTPATIENT)
Dept: LAB | Facility: LAB | Age: 74
End: 2024-12-19
Payer: COMMERCIAL

## 2025-01-27 DIAGNOSIS — K21.00 GASTROESOPHAGEAL REFLUX DISEASE WITH ESOPHAGITIS WITHOUT HEMORRHAGE: ICD-10-CM

## 2025-01-27 DIAGNOSIS — E03.9 HYPOTHYROIDISM, UNSPECIFIED TYPE: ICD-10-CM

## 2025-01-28 ENCOUNTER — LAB (OUTPATIENT)
Dept: LAB | Facility: HOSPITAL | Age: 75
End: 2025-01-28
Payer: COMMERCIAL

## 2025-01-28 DIAGNOSIS — R97.20 ELEVATED PSA: ICD-10-CM

## 2025-01-28 DIAGNOSIS — D51.9 ANEMIA DUE TO VITAMIN B12 DEFICIENCY, UNSPECIFIED B12 DEFICIENCY TYPE: ICD-10-CM

## 2025-01-28 DIAGNOSIS — D51.9 VITAMIN B12 DEFICIENCY ANEMIA, UNSPECIFIED: Primary | ICD-10-CM

## 2025-01-28 LAB
BASOPHILS # BLD AUTO: 0.07 X10*3/UL (ref 0–0.1)
BASOPHILS NFR BLD AUTO: 1.2 %
EOSINOPHIL # BLD AUTO: 0.28 X10*3/UL (ref 0–0.4)
EOSINOPHIL NFR BLD AUTO: 4.8 %
ERYTHROCYTE [DISTWIDTH] IN BLOOD BY AUTOMATED COUNT: 16.2 % (ref 11.5–14.5)
HCT VFR BLD AUTO: 41 % (ref 41–52)
HGB BLD-MCNC: 13.6 G/DL (ref 13.5–17.5)
IMM GRANULOCYTES # BLD AUTO: 0.02 X10*3/UL (ref 0–0.5)
IMM GRANULOCYTES NFR BLD AUTO: 0.3 % (ref 0–0.9)
LYMPHOCYTES # BLD AUTO: 2.08 X10*3/UL (ref 0.8–3)
LYMPHOCYTES NFR BLD AUTO: 35.5 %
MCH RBC QN AUTO: 30.9 PG (ref 26–34)
MCHC RBC AUTO-ENTMCNC: 33.2 G/DL (ref 32–36)
MCV RBC AUTO: 93 FL (ref 80–100)
MONOCYTES # BLD AUTO: 0.62 X10*3/UL (ref 0.05–0.8)
MONOCYTES NFR BLD AUTO: 10.6 %
NEUTROPHILS # BLD AUTO: 2.79 X10*3/UL (ref 1.6–5.5)
NEUTROPHILS NFR BLD AUTO: 47.6 %
NRBC BLD-RTO: 0 /100 WBCS (ref 0–0)
PLATELET # BLD AUTO: 255 X10*3/UL (ref 150–450)
RBC # BLD AUTO: 4.4 X10*6/UL (ref 4.5–5.9)
VIT B12 SERPL-MCNC: >2000 PG/ML (ref 211–911)
WBC # BLD AUTO: 5.9 X10*3/UL (ref 4.4–11.3)

## 2025-01-28 PROCEDURE — 36415 COLL VENOUS BLD VENIPUNCTURE: CPT

## 2025-01-28 PROCEDURE — 85025 COMPLETE CBC W/AUTO DIFF WBC: CPT

## 2025-01-28 PROCEDURE — 82607 VITAMIN B-12: CPT | Mod: WESLAB

## 2025-01-28 PROCEDURE — 82607 VITAMIN B-12: CPT

## 2025-01-28 RX ORDER — LEVOTHYROXINE SODIUM 75 UG/1
TABLET ORAL
Qty: 30 TABLET | Refills: 0 | Status: SHIPPED | OUTPATIENT
Start: 2025-01-28

## 2025-01-28 RX ORDER — PANTOPRAZOLE SODIUM 40 MG/1
TABLET, DELAYED RELEASE ORAL
Qty: 30 TABLET | Refills: 0 | Status: SHIPPED | OUTPATIENT
Start: 2025-01-28

## 2025-01-29 LAB — PSA SERPL-MCNC: 3.1 NG/ML

## 2025-02-03 DIAGNOSIS — K21.00 GASTROESOPHAGEAL REFLUX DISEASE WITH ESOPHAGITIS WITHOUT HEMORRHAGE: ICD-10-CM

## 2025-02-04 ENCOUNTER — TELEPHONE (OUTPATIENT)
Dept: HEMATOLOGY/ONCOLOGY | Facility: CLINIC | Age: 75
End: 2025-02-04
Payer: COMMERCIAL

## 2025-02-04 RX ORDER — PANTOPRAZOLE SODIUM 40 MG/1
TABLET, DELAYED RELEASE ORAL
Qty: 30 TABLET | Refills: 0 | OUTPATIENT
Start: 2025-02-04

## 2025-02-10 ENCOUNTER — TELEPHONE (OUTPATIENT)
Dept: PRIMARY CARE | Facility: CLINIC | Age: 75
End: 2025-02-10

## 2025-02-10 ENCOUNTER — APPOINTMENT (OUTPATIENT)
Dept: PRIMARY CARE | Facility: CLINIC | Age: 75
End: 2025-02-10
Payer: COMMERCIAL

## 2025-02-10 VITALS
HEART RATE: 65 BPM | DIASTOLIC BLOOD PRESSURE: 60 MMHG | WEIGHT: 95 LBS | BODY MASS INDEX: 16.22 KG/M2 | HEIGHT: 64 IN | OXYGEN SATURATION: 98 % | SYSTOLIC BLOOD PRESSURE: 110 MMHG

## 2025-02-10 DIAGNOSIS — E78.5 HYPERLIPIDEMIA, UNSPECIFIED HYPERLIPIDEMIA TYPE: ICD-10-CM

## 2025-02-10 DIAGNOSIS — E03.9 HYPOTHYROIDISM, UNSPECIFIED TYPE: ICD-10-CM

## 2025-02-10 DIAGNOSIS — K21.00 GASTROESOPHAGEAL REFLUX DISEASE WITH ESOPHAGITIS WITHOUT HEMORRHAGE: ICD-10-CM

## 2025-02-10 DIAGNOSIS — I25.10 ATHEROSCLEROSIS OF NATIVE CORONARY ARTERY OF NATIVE HEART WITHOUT ANGINA PECTORIS: ICD-10-CM

## 2025-02-10 PROBLEM — Z86.39 HISTORY OF GRAVES' DISEASE: Status: ACTIVE | Noted: 2025-02-10

## 2025-02-10 PROBLEM — Z85.01 HISTORY OF MALIGNANT NEOPLASM OF ESOPHAGUS: Status: ACTIVE | Noted: 2025-02-10

## 2025-02-10 PROBLEM — Z86.39 HISTORY OF ELEVATED LIPIDS: Status: ACTIVE | Noted: 2025-02-10

## 2025-02-10 PROCEDURE — 1126F AMNT PAIN NOTED NONE PRSNT: CPT | Performed by: STUDENT IN AN ORGANIZED HEALTH CARE EDUCATION/TRAINING PROGRAM

## 2025-02-10 PROCEDURE — 1036F TOBACCO NON-USER: CPT | Performed by: STUDENT IN AN ORGANIZED HEALTH CARE EDUCATION/TRAINING PROGRAM

## 2025-02-10 PROCEDURE — 3008F BODY MASS INDEX DOCD: CPT | Performed by: STUDENT IN AN ORGANIZED HEALTH CARE EDUCATION/TRAINING PROGRAM

## 2025-02-10 PROCEDURE — 99214 OFFICE O/P EST MOD 30 MIN: CPT | Performed by: STUDENT IN AN ORGANIZED HEALTH CARE EDUCATION/TRAINING PROGRAM

## 2025-02-10 PROCEDURE — 1159F MED LIST DOCD IN RCRD: CPT | Performed by: STUDENT IN AN ORGANIZED HEALTH CARE EDUCATION/TRAINING PROGRAM

## 2025-02-10 RX ORDER — ROSUVASTATIN CALCIUM 5 MG/1
5 TABLET, COATED ORAL DAILY
Qty: 90 TABLET | Refills: 1 | Status: SHIPPED | OUTPATIENT
Start: 2025-02-10 | End: 2025-08-09

## 2025-02-10 RX ORDER — LEVOTHYROXINE SODIUM 75 UG/1
75 TABLET ORAL DAILY
Qty: 90 TABLET | Refills: 1 | Status: SHIPPED | OUTPATIENT
Start: 2025-02-10

## 2025-02-10 RX ORDER — PANTOPRAZOLE SODIUM 40 MG/1
TABLET, DELAYED RELEASE ORAL
Qty: 90 TABLET | Refills: 1 | Status: SHIPPED | OUTPATIENT
Start: 2025-02-10

## 2025-02-10 ASSESSMENT — ENCOUNTER SYMPTOMS
SLEEP DISTURBANCE: 0
DYSURIA: 0
DEPRESSION: 0
SINUS PRESSURE: 0
MYALGIAS: 0
DIZZINESS: 0
FEVER: 0
WHEEZING: 0
NERVOUS/ANXIOUS: 0
ARTHRALGIAS: 0
VOMITING: 0
HEADACHES: 0
DIARRHEA: 0
PALPITATIONS: 0
DYSPHORIC MOOD: 0
SHORTNESS OF BREATH: 0
NAUSEA: 0
CHILLS: 0
LIGHT-HEADEDNESS: 0
LOSS OF SENSATION IN FEET: 0
FATIGUE: 0
WOUND: 0
RHINORRHEA: 0
SINUS PAIN: 0
COUGH: 0
OCCASIONAL FEELINGS OF UNSTEADINESS: 0
CONSTIPATION: 0
FREQUENCY: 0
POLYDIPSIA: 0

## 2025-02-10 ASSESSMENT — PAIN SCALES - GENERAL: PAINLEVEL_OUTOF10: 0-NO PAIN

## 2025-02-11 NOTE — PROGRESS NOTES
"Subjective   Patient ID: Irving Sandoval is a 74 y.o. male who presents for New Patient Visit (TSH check, pain in left side of abdomen., comes and goes for approx 4-5mo. ).    Here today as new patient.  Requesting updated TSH following dose change August 2024.  Otherwise not having any particular complaints.     States he does have occasional, very mild LLQ pain.  Not particularly bothered by this, but states he felt like he had to mention something.  Pain happens 1-2 times per month.  Lasts for a few seconds, then resolves without intervention.  No known aggravating or relieving factors.          Review of Systems   Constitutional:  Negative for chills, fatigue and fever.   HENT:  Negative for congestion, hearing loss, rhinorrhea, sinus pressure, sinus pain and tinnitus.    Eyes:  Negative for visual disturbance.   Respiratory:  Negative for cough, shortness of breath and wheezing.    Cardiovascular:  Negative for chest pain, palpitations and leg swelling.   Gastrointestinal:  Negative for constipation, diarrhea, nausea and vomiting.   Endocrine: Negative for cold intolerance, heat intolerance, polydipsia and polyuria.   Genitourinary:  Negative for dysuria, frequency and urgency.   Musculoskeletal:  Negative for arthralgias and myalgias.   Skin:  Negative for pallor, rash and wound.   Neurological:  Negative for dizziness, light-headedness and headaches.   Psychiatric/Behavioral:  Negative for dysphoric mood and sleep disturbance. The patient is not nervous/anxious.        Objective     Visit Vitals  /60 (BP Location: Left arm, Patient Position: Sitting, BP Cuff Size: Adult)   Pulse 65   Ht 1.626 m (5' 4\")   Wt (!) 43.1 kg (95 lb)   SpO2 98%   BMI 16.31 kg/m²   Smoking Status Former   BSA 1.4 m²         Physical Exam  Constitutional:       Appearance: Normal appearance. He is underweight.   HENT:      Head: Normocephalic and atraumatic.      Right Ear: Tympanic membrane, ear canal and external ear normal.    "   Left Ear: Tympanic membrane, ear canal and external ear normal.      Nose: Nose normal.      Mouth/Throat:      Mouth: Mucous membranes are moist.      Pharynx: Oropharynx is clear.   Eyes:      Extraocular Movements: Extraocular movements intact.      Conjunctiva/sclera: Conjunctivae normal.      Pupils: Pupils are equal, round, and reactive to light.   Cardiovascular:      Rate and Rhythm: Normal rate and regular rhythm.      Pulses: Normal pulses.      Heart sounds: Normal heart sounds.   Pulmonary:      Effort: Pulmonary effort is normal.      Breath sounds: Normal breath sounds.   Abdominal:      General: There is no distension.      Palpations: Abdomen is soft.      Tenderness: There is no abdominal tenderness.   Musculoskeletal:         General: Normal range of motion.   Skin:     General: Skin is warm and dry.   Neurological:      Mental Status: He is alert and oriented to person, place, and time. Mental status is at baseline.   Psychiatric:         Mood and Affect: Mood normal.         Behavior: Behavior normal.         Assessment & Plan  Hyperlipidemia, unspecified hyperlipidemia type    Orders:    rosuvastatin (Crestor) 5 mg tablet; Take 1 tablet (5 mg) by mouth once daily.    Atherosclerosis of native coronary artery of native heart without angina pectoris    Orders:    rosuvastatin (Crestor) 5 mg tablet; Take 1 tablet (5 mg) by mouth once daily.    Hypothyroidism, unspecified type    Orders:    levothyroxine (Synthroid, Levoxyl) 75 mcg tablet; Take 1 tablet (75 mcg) by mouth early in the morning.. Take on an empty stomach at the same time each day, either 30 to 60 minutes prior to breakfast    TSH; Future  will adjust dose if indicated based on result.  If no dose change, to follow up in 6 months, if dose changed, would follow up in 2-3 months.   Gastroesophageal reflux disease with esophagitis without hemorrhage    Orders:    pantoprazole (ProtoNix) 40 mg EC tablet; TAKE 1 TABLET BY MOUTH ONCE  DAILY    Chronic medications refilled as indicated.  Updated TSH ordered.    Reviewed and approved by SHERMAN MAZARIEGOS on 2/10/25 at 9:17 PM.

## 2025-02-21 NOTE — PROGRESS NOTES
Subjective   Patient ID: Irving Sandoval is a 74 y.o. male.      HPI  74 y.o. male presents for a follow up visit regarding an elevated PSA level. MRI completed on 01/12/2024 demonstrated BPH changes with a PI-RADS 2 lesion. Recent PSA 3.10, which is decreased from 4.2 5 months prior.     He has a history of cardiac issues.     He denies urinary symptoms at this time.     Lab Results   Component Value Date    PSA 3.10 01/28/2025    PSA 4.20 (H) 08/20/2024    PSA 4.80 (H) 02/13/2024    PSA 5.9 (H) 09/18/2023    PSA 5.2 (H) 03/02/2023 1/12/24 MRI prostate.   IMPRESSION:  1.  BPH changes of the transition zone. Diffuse non nodular  hypointensities within the peripheral zone, without evidence of  focally restricted diffusion ( PI-RADS 2).    Review of Systems  A complete review of systems was performed. All systems are noted to be negative unless indicated in the history of present illness, impression, active problem list, or past histories.     Objective   Physical Exam    Assessment/Plan   Diagnoses and all orders for this visit:  Elevated PSA      74 y.o. male presents for a follow up visit regarding an elevated PSA level. MRI completed on 01/12/2024 demonstrated BPH changes with a PI-RADS 2 lesion. Recent PSA 3.10, which is decreased from 4.2 5 months prior.     I personally reviewed the PSA level that is now normal. I have reviewed with the patient that he no longer requires my care. He is able to follow up as needed.     Plan:  FUV as needed      Scribe Attestation   By signing my name below, I, Nelson Cantu, Scribe attestation that this documentation has been prepared under the direction and in the presence of Tj Coley MD.

## 2025-02-24 ENCOUNTER — APPOINTMENT (OUTPATIENT)
Dept: UROLOGY | Facility: CLINIC | Age: 75
End: 2025-02-24
Payer: COMMERCIAL

## 2025-02-24 DIAGNOSIS — R97.20 ELEVATED PSA: Primary | ICD-10-CM

## 2025-02-24 PROCEDURE — 1159F MED LIST DOCD IN RCRD: CPT | Performed by: STUDENT IN AN ORGANIZED HEALTH CARE EDUCATION/TRAINING PROGRAM

## 2025-02-24 PROCEDURE — 99212 OFFICE O/P EST SF 10 MIN: CPT | Performed by: STUDENT IN AN ORGANIZED HEALTH CARE EDUCATION/TRAINING PROGRAM

## 2025-02-26 ENCOUNTER — TELEPHONE (OUTPATIENT)
Dept: PRIMARY CARE | Facility: CLINIC | Age: 75
End: 2025-02-26
Payer: COMMERCIAL

## 2025-02-26 NOTE — TELEPHONE ENCOUNTER
Patient's Sister Genny called and stated Patient was going to add her to his chart so she could speak with ALEJANDRA on his behalf. Did Patient add Genny Delgado? Genny is worried about Patient's well being.    Please advise    Patient can be reached at [phone]

## 2025-02-26 NOTE — TELEPHONE ENCOUNTER
I'm not sure if we got a release form for her to be contacted or not.  I'm not seeing one in the documents in his chart.  Might be worth asking Jasmyn if one came across her desk?

## 2025-02-27 NOTE — TELEPHONE ENCOUNTER
"Spoke to patient's sister, Genny.  Sister is concerned because they can no longer understand Irving when he speaks.  There is some history of developmental disability in the family including in [place].     Patient's sister is concerned that his home is \"not fit to live in.\"  Irving lived with brother, who became severely ill and ultimately passed away.  Patient now living independently.  Has not been able to keep up with a lot of the maintenance of the home.  Has a nephew living with him who requires care.      Patient generally tells family he is \"fine\" but does not necessarily allow them to visit.      Patient has some difficulty with reading - will often have to spell out unfamiliar words.    Patient's sister is very concerned that he may not be safe living in current conditions.    "

## 2025-02-27 NOTE — TELEPHONE ENCOUNTER
Spoke to patient's sister.  I'll have some follow up I need to do, but okay to shannon done on your end.     Report sent to [facility] regarding conversation with patient's sister.

## 2025-02-27 NOTE — TELEPHONE ENCOUNTER
Please reach out to patient and see if he allows me to discuss care and his health information with his sister Genny.

## 2025-02-27 NOTE — TELEPHONE ENCOUNTER
Called and spoke with Patient and he stated he gives RANDA permission to speak with his sister Genny regarding Patient's Care and his health information.     Patient can be reached at [phone]    Genny can be reached at [phone]

## 2025-02-28 LAB — TSH SERPL-ACNC: 0.44 MIU/L (ref 0.4–4.5)

## 2025-03-03 ENCOUNTER — TELEPHONE (OUTPATIENT)
Dept: PRIMARY CARE | Facility: CLINIC | Age: 75
End: 2025-03-03
Payer: COMMERCIAL

## 2025-03-14 ENCOUNTER — HOSPITAL ENCOUNTER (OUTPATIENT)
Dept: RADIOLOGY | Facility: HOSPITAL | Age: 75
Discharge: HOME | End: 2025-03-14
Payer: COMMERCIAL

## 2025-03-14 DIAGNOSIS — J44.9 CHRONIC OBSTRUCTIVE PULMONARY DISEASE, UNSPECIFIED: ICD-10-CM

## 2025-03-14 PROCEDURE — 71046 X-RAY EXAM CHEST 2 VIEWS: CPT

## 2025-03-17 ENCOUNTER — TELEPHONE (OUTPATIENT)
Dept: PRIMARY CARE | Facility: CLINIC | Age: 75
End: 2025-03-17
Payer: COMMERCIAL

## 2025-03-17 NOTE — TELEPHONE ENCOUNTER
Becky called   [phone] pt sister wanted to know if what she and ALEJANDRA discuss about pt is between them,  Welfare went out last week for a check on him to make sure he felt safe

## 2025-03-17 NOTE — TELEPHONE ENCOUNTER
I am legally required to report any suspicion of neglect or abuse.  If anything like that is disclosed, I will report it.  Other things are kept in appropriate confidentiality.

## 2025-05-21 ENCOUNTER — OFFICE VISIT (OUTPATIENT)
Facility: CLINIC | Age: 75
End: 2025-05-21
Payer: COMMERCIAL

## 2025-05-21 VITALS
WEIGHT: 92 LBS | DIASTOLIC BLOOD PRESSURE: 60 MMHG | OXYGEN SATURATION: 96 % | BODY MASS INDEX: 15.79 KG/M2 | HEART RATE: 66 BPM | SYSTOLIC BLOOD PRESSURE: 100 MMHG

## 2025-05-21 DIAGNOSIS — E78.5 HYPERLIPIDEMIA: Primary | ICD-10-CM

## 2025-05-21 PROCEDURE — 1159F MED LIST DOCD IN RCRD: CPT | Performed by: INTERNAL MEDICINE

## 2025-05-21 PROCEDURE — 99214 OFFICE O/P EST MOD 30 MIN: CPT | Performed by: INTERNAL MEDICINE

## 2025-05-21 PROCEDURE — 1036F TOBACCO NON-USER: CPT | Performed by: INTERNAL MEDICINE

## 2025-05-21 PROCEDURE — 1126F AMNT PAIN NOTED NONE PRSNT: CPT | Performed by: INTERNAL MEDICINE

## 2025-05-21 ASSESSMENT — ENCOUNTER SYMPTOMS
DEPRESSION: 0
LOSS OF SENSATION IN FEET: 0
OCCASIONAL FEELINGS OF UNSTEADINESS: 0

## 2025-05-21 ASSESSMENT — LIFESTYLE VARIABLES: TOTAL SCORE: 0

## 2025-05-21 ASSESSMENT — PAIN SCALES - GENERAL: PAINLEVEL_OUTOF10: 0-NO PAIN

## 2025-05-21 NOTE — PROGRESS NOTES
Subjective      No chief complaint on file.       Here to reassess coronary artery disease, angina free with no signs of heart failure    Previous: He has a history of high cardiac calcium score as well as a COPD from his smoking.  He does have a history of hypercholesterolemia.  He underwent a stress test in December 2023 showing EF of 83% normal wall motion nontransmural area of the septum which did not perfuse in the small rolf-infarction ischemia.            Review of Systems   All other systems reviewed and are negative.       Objective   Physical Exam  Constitutional:       Appearance: Normal appearance.   HENT:      Head: Normocephalic and atraumatic.   Eyes:      Pupils: Pupils are equal, round, and reactive to light.   Cardiovascular:      Rate and Rhythm: Normal rate and regular rhythm.      Pulses: Normal pulses.      Heart sounds: Normal heart sounds.   Pulmonary:      Effort: Pulmonary effort is normal.      Breath sounds: Normal breath sounds.   Abdominal:      General: Abdomen is flat. Bowel sounds are normal.      Palpations: Abdomen is soft.   Musculoskeletal:         General: Normal range of motion.      Cervical back: Normal range of motion.   Skin:     General: Skin is warm and dry.   Neurological:      General: No focal deficit present.   Psychiatric:         Mood and Affect: Mood normal.         Judgment: Judgment normal.          Lab Review:   Not applicable    Atherosclerosis of native coronary artery of native heart without angina pectoris  No angina in the presence of chronic nitrate therapy and aspirin    History of elevated lipids  Continue on low-dose statin therapy cannot tolerate higher doses and is angina free

## 2025-05-21 NOTE — ASSESSMENT & PLAN NOTE
Continue on low-dose statin therapy cannot tolerate higher doses and is angina free.  He will have his lipid profile drawn in August 2025 with his annual lab work from his PCP and follow-up with me in 6 months.

## 2025-06-30 ENCOUNTER — TELEPHONE (OUTPATIENT)
Dept: PRIMARY CARE | Facility: CLINIC | Age: 75
End: 2025-06-30
Payer: COMMERCIAL

## 2025-06-30 DIAGNOSIS — K21.00 GASTROESOPHAGEAL REFLUX DISEASE WITH ESOPHAGITIS WITHOUT HEMORRHAGE: ICD-10-CM

## 2025-06-30 DIAGNOSIS — E03.9 HYPOTHYROIDISM, UNSPECIFIED TYPE: ICD-10-CM

## 2025-06-30 RX ORDER — PANTOPRAZOLE SODIUM 40 MG/1
TABLET, DELAYED RELEASE ORAL
Qty: 90 TABLET | Refills: 0 | Status: SHIPPED | OUTPATIENT
Start: 2025-06-30

## 2025-06-30 RX ORDER — LEVOTHYROXINE SODIUM 75 UG/1
75 TABLET ORAL DAILY
Qty: 90 TABLET | Refills: 0 | Status: SHIPPED | OUTPATIENT
Start: 2025-06-30

## 2025-06-30 NOTE — TELEPHONE ENCOUNTER
Rx Refill Request Telephone Encounter    Name:  Irving B Jaime  :  [id number]  Medication Name:  Pantoprazole            Specific Pharmacy location:  [facility]  Date of last appointment:  2/10/25  Date of next appointment:  25  Best number to reach patient:

## 2025-07-11 DIAGNOSIS — E03.9 HYPOTHYROIDISM, UNSPECIFIED TYPE: ICD-10-CM

## 2025-07-11 DIAGNOSIS — E78.5 HYPERLIPIDEMIA, UNSPECIFIED HYPERLIPIDEMIA TYPE: ICD-10-CM

## 2025-07-12 LAB
ALBUMIN SERPL-MCNC: 3.9 G/DL (ref 3.6–5.1)
ALP SERPL-CCNC: 44 U/L (ref 35–144)
ALT SERPL-CCNC: 12 U/L (ref 9–46)
ANION GAP SERPL CALCULATED.4IONS-SCNC: 6 MMOL/L (CALC) (ref 7–17)
APPEARANCE UR: CLEAR
AST SERPL-CCNC: 21 U/L (ref 10–35)
BACTERIA #/AREA URNS HPF: ABNORMAL /HPF
BASOPHILS # BLD AUTO: 67 CELLS/UL (ref 0–200)
BASOPHILS NFR BLD AUTO: 1.2 %
BILIRUB SERPL-MCNC: 1.3 MG/DL (ref 0.2–1.2)
BILIRUB UR QL STRIP: NEGATIVE
BUN SERPL-MCNC: 18 MG/DL (ref 7–25)
CALCIUM SERPL-MCNC: 9.1 MG/DL (ref 8.6–10.3)
CHLORIDE SERPL-SCNC: 104 MMOL/L (ref 98–110)
CHOLEST SERPL-MCNC: 157 MG/DL
CHOLEST/HDLC SERPL: 2.1 (CALC)
CO2 SERPL-SCNC: 27 MMOL/L (ref 20–32)
COLOR UR: YELLOW
CREAT SERPL-MCNC: 0.97 MG/DL (ref 0.7–1.28)
EGFRCR SERPLBLD CKD-EPI 2021: 82 ML/MIN/1.73M2
EOSINOPHIL # BLD AUTO: 202 CELLS/UL (ref 15–500)
EOSINOPHIL NFR BLD AUTO: 3.6 %
ERYTHROCYTE [DISTWIDTH] IN BLOOD BY AUTOMATED COUNT: 13.7 % (ref 11–15)
GLUCOSE SERPL-MCNC: 82 MG/DL (ref 65–99)
GLUCOSE UR QL STRIP: NEGATIVE
HCT VFR BLD AUTO: 38.8 % (ref 38.5–50)
HDLC SERPL-MCNC: 76 MG/DL
HGB BLD-MCNC: 12.7 G/DL (ref 13.2–17.1)
HGB UR QL STRIP: NEGATIVE
HYALINE CASTS #/AREA URNS LPF: ABNORMAL /LPF
KETONES UR QL STRIP: ABNORMAL
LDLC SERPL CALC-MCNC: 70 MG/DL (CALC)
LEUKOCYTE ESTERASE UR QL STRIP: ABNORMAL
LYMPHOCYTES # BLD AUTO: 2257 CELLS/UL (ref 850–3900)
LYMPHOCYTES NFR BLD AUTO: 40.3 %
MCH RBC QN AUTO: 31.6 PG (ref 27–33)
MCHC RBC AUTO-ENTMCNC: 32.7 G/DL (ref 32–36)
MCV RBC AUTO: 96.5 FL (ref 80–100)
MONOCYTES # BLD AUTO: 577 CELLS/UL (ref 200–950)
MONOCYTES NFR BLD AUTO: 10.3 %
NEUTROPHILS # BLD AUTO: 2498 CELLS/UL (ref 1500–7800)
NEUTROPHILS NFR BLD AUTO: 44.6 %
NITRITE UR QL STRIP: NEGATIVE
NONHDLC SERPL-MCNC: 81 MG/DL (CALC)
PH UR STRIP: 5.5 [PH] (ref 5–8)
PLATELET # BLD AUTO: 246 THOUSAND/UL (ref 140–400)
PMV BLD REES-ECKER: 12 FL (ref 7.5–12.5)
POTASSIUM SERPL-SCNC: 5.5 MMOL/L (ref 3.5–5.3)
PROT SERPL-MCNC: 6.2 G/DL (ref 6.1–8.1)
PROT UR QL STRIP: NEGATIVE
RBC # BLD AUTO: 4.02 MILLION/UL (ref 4.2–5.8)
RBC #/AREA URNS HPF: ABNORMAL /HPF
SERVICE CMNT-IMP: ABNORMAL
SODIUM SERPL-SCNC: 137 MMOL/L (ref 135–146)
SP GR UR STRIP: 1.02 (ref 1–1.03)
SQUAMOUS #/AREA URNS HPF: ABNORMAL /HPF
TRIGL SERPL-MCNC: 39 MG/DL
TSH SERPL-ACNC: 0.16 MIU/L (ref 0.4–4.5)
WBC # BLD AUTO: 5.6 THOUSAND/UL (ref 3.8–10.8)
WBC #/AREA URNS HPF: ABNORMAL /HPF

## 2025-08-11 ENCOUNTER — APPOINTMENT (OUTPATIENT)
Dept: PRIMARY CARE | Facility: CLINIC | Age: 75
End: 2025-08-11
Payer: COMMERCIAL

## 2025-08-12 ENCOUNTER — APPOINTMENT (OUTPATIENT)
Dept: PRIMARY CARE | Facility: CLINIC | Age: 75
End: 2025-08-12
Payer: COMMERCIAL

## 2025-08-12 VITALS
OXYGEN SATURATION: 98 % | HEIGHT: 64 IN | DIASTOLIC BLOOD PRESSURE: 60 MMHG | HEART RATE: 69 BPM | SYSTOLIC BLOOD PRESSURE: 98 MMHG | BODY MASS INDEX: 15.88 KG/M2 | WEIGHT: 93 LBS

## 2025-08-12 DIAGNOSIS — E03.9 HYPOTHYROIDISM, UNSPECIFIED TYPE: ICD-10-CM

## 2025-08-12 DIAGNOSIS — Z00.00 ROUTINE GENERAL MEDICAL EXAMINATION AT A HEALTH CARE FACILITY: ICD-10-CM

## 2025-08-12 DIAGNOSIS — J44.9 CHRONIC OBSTRUCTIVE PULMONARY DISEASE, UNSPECIFIED COPD TYPE (MULTI): Primary | ICD-10-CM

## 2025-08-12 DIAGNOSIS — R60.0 BILATERAL LOWER EXTREMITY EDEMA: ICD-10-CM

## 2025-08-12 DIAGNOSIS — I38 VALVULAR HEART DISEASE: ICD-10-CM

## 2025-08-12 PROCEDURE — 99397 PER PM REEVAL EST PAT 65+ YR: CPT | Performed by: STUDENT IN AN ORGANIZED HEALTH CARE EDUCATION/TRAINING PROGRAM

## 2025-08-12 PROCEDURE — 1126F AMNT PAIN NOTED NONE PRSNT: CPT | Performed by: STUDENT IN AN ORGANIZED HEALTH CARE EDUCATION/TRAINING PROGRAM

## 2025-08-12 PROCEDURE — 99214 OFFICE O/P EST MOD 30 MIN: CPT | Performed by: STUDENT IN AN ORGANIZED HEALTH CARE EDUCATION/TRAINING PROGRAM

## 2025-08-12 PROCEDURE — 1159F MED LIST DOCD IN RCRD: CPT | Performed by: STUDENT IN AN ORGANIZED HEALTH CARE EDUCATION/TRAINING PROGRAM

## 2025-08-12 PROCEDURE — 3008F BODY MASS INDEX DOCD: CPT | Performed by: STUDENT IN AN ORGANIZED HEALTH CARE EDUCATION/TRAINING PROGRAM

## 2025-08-12 RX ORDER — LEVOTHYROXINE SODIUM 50 UG/1
50 TABLET ORAL DAILY
Qty: 90 TABLET | Refills: 1 | Status: SHIPPED | OUTPATIENT
Start: 2025-08-12

## 2025-08-12 ASSESSMENT — ENCOUNTER SYMPTOMS
WHEEZING: 0
PALPITATIONS: 0
NERVOUS/ANXIOUS: 0
DYSPHORIC MOOD: 0
CHEST TIGHTNESS: 0
COUGH: 0
MYALGIAS: 0
ARTHRALGIAS: 0
SHORTNESS OF BREATH: 1

## 2025-08-12 ASSESSMENT — PAIN SCALES - GENERAL: PAINLEVEL_OUTOF10: 0-NO PAIN

## 2025-08-12 ASSESSMENT — COLUMBIA-SUICIDE SEVERITY RATING SCALE - C-SSRS: 1. IN THE PAST MONTH, HAVE YOU WISHED YOU WERE DEAD OR WISHED YOU COULD GO TO SLEEP AND NOT WAKE UP?: NO

## 2025-09-02 ENCOUNTER — TELEPHONE (OUTPATIENT)
Dept: PRIMARY CARE | Facility: CLINIC | Age: 75
End: 2025-09-02
Payer: COMMERCIAL

## 2025-09-02 DIAGNOSIS — K21.00 GASTROESOPHAGEAL REFLUX DISEASE WITH ESOPHAGITIS WITHOUT HEMORRHAGE: ICD-10-CM

## 2025-09-03 RX ORDER — PANTOPRAZOLE SODIUM 40 MG/1
TABLET, DELAYED RELEASE ORAL
Qty: 90 TABLET | Refills: 0 | Status: SHIPPED | OUTPATIENT
Start: 2025-09-03